# Patient Record
Sex: MALE | Race: WHITE | NOT HISPANIC OR LATINO | Employment: FULL TIME | URBAN - METROPOLITAN AREA
[De-identification: names, ages, dates, MRNs, and addresses within clinical notes are randomized per-mention and may not be internally consistent; named-entity substitution may affect disease eponyms.]

---

## 2019-04-23 ENCOUNTER — OFFICE VISIT (OUTPATIENT)
Dept: PRIMARY CARE CLINIC | Facility: CLINIC | Age: 59
End: 2019-04-23
Attending: NURSE PRACTITIONER
Payer: COMMERCIAL

## 2019-04-23 VITALS
SYSTOLIC BLOOD PRESSURE: 149 MMHG | RESPIRATION RATE: 16 BRPM | OXYGEN SATURATION: 98 % | HEART RATE: 70 BPM | BODY MASS INDEX: 19.25 KG/M2 | HEIGHT: 74 IN | TEMPERATURE: 99 F | WEIGHT: 150 LBS | DIASTOLIC BLOOD PRESSURE: 74 MMHG

## 2019-04-23 DIAGNOSIS — F11.20 OPIATE DEPENDENCE, CONTINUOUS: Primary | ICD-10-CM

## 2019-04-23 DIAGNOSIS — G89.29 CHRONIC NECK AND BACK PAIN: ICD-10-CM

## 2019-04-23 DIAGNOSIS — M54.2 CHRONIC NECK AND BACK PAIN: ICD-10-CM

## 2019-04-23 DIAGNOSIS — G89.4 CHRONIC PAIN SYNDROME: ICD-10-CM

## 2019-04-23 DIAGNOSIS — M54.9 CHRONIC NECK AND BACK PAIN: ICD-10-CM

## 2019-04-23 DIAGNOSIS — F11.93 OPIATE WITHDRAWAL: ICD-10-CM

## 2019-04-23 DIAGNOSIS — F32.A DEPRESSION, UNSPECIFIED DEPRESSION TYPE: ICD-10-CM

## 2019-04-23 PROCEDURE — 99202 OFFICE O/P NEW SF 15 MIN: CPT | Mod: S$GLB,,, | Performed by: NURSE PRACTITIONER

## 2019-04-23 PROCEDURE — 3008F BODY MASS INDEX DOCD: CPT | Mod: CPTII,S$GLB,, | Performed by: NURSE PRACTITIONER

## 2019-04-23 PROCEDURE — 99999 PR PBB SHADOW E&M-NEW PATIENT-LVL III: ICD-10-PCS | Mod: PBBFAC,,, | Performed by: NURSE PRACTITIONER

## 2019-04-23 PROCEDURE — 99202 PR OFFICE/OUTPT VISIT, NEW, LEVL II, 15-29 MIN: ICD-10-PCS | Mod: S$GLB,,, | Performed by: NURSE PRACTITIONER

## 2019-04-23 PROCEDURE — 3008F PR BODY MASS INDEX (BMI) DOCUMENTED: ICD-10-PCS | Mod: CPTII,S$GLB,, | Performed by: NURSE PRACTITIONER

## 2019-04-23 PROCEDURE — 99999 PR PBB SHADOW E&M-NEW PATIENT-LVL III: CPT | Mod: PBBFAC,,, | Performed by: NURSE PRACTITIONER

## 2019-04-23 RX ORDER — OXYCODONE AND ACETAMINOPHEN 10; 325 MG/1; MG/1
TABLET ORAL
Refills: 0 | COMMUNITY
Start: 2019-02-01 | End: 2019-05-22

## 2019-04-23 RX ORDER — FLUOXETINE HYDROCHLORIDE 20 MG/1
20 CAPSULE ORAL EVERY 12 HOURS
Refills: 11 | Status: ON HOLD | COMMUNITY
Start: 2019-01-29 | End: 2021-09-17

## 2019-04-23 RX ORDER — HYDROCODONE BITARTRATE AND ACETAMINOPHEN 10; 325 MG/1; MG/1
1 TABLET ORAL
COMMUNITY
End: 2019-05-22

## 2019-04-23 NOTE — PROGRESS NOTES
"Patient identified by name and date of birth. Patient states he wants to be seen as he is not feeling well. States,"I just don't feel well." In getting patients history and chief complaint he states that he takes oxycodone and hydrocodone and that he promised his kids on Sunday he would stop taking this medication. States he has not had this medication since Sunday and now he does not feel well and is getting a runny nose. Adds that he has cervical disc and lumbar disc pain and that is what he has been taking this medication for, for over twenty years. Patient would not say who prescribes this medication just states that "He" switches it up.  "

## 2019-04-23 NOTE — PROGRESS NOTES
"Subjective:       Patient ID: Beka Bee is a 59 y.o. male.    Chief Complaint: Fatigue    Patient is a 59 year old male who presents to clinic today as an acute walk in patient with complaints of "not feeling good" .  He has never been seen by this provider and is new to this clinic.  He reports he is "very tired and feels terrible".  He states he is withdrawn and "hiding at home not going to the grocery or socializing with anyone".  He is feeling that "he cannot do his job and is very depressed".  He states he has been taking oxycodone and hydrocodone for a long time and stopped taking them on Sunday.  He was taking fluoxetine 20 mg and stopped taking it.  He states he has had chronic neck and back pain and is followed by Dr. De La Cruz in [place], [place] but "does not want to take pain medication any more".  Upon initial discussion patient reports he has "thought about suicide" but then reported "he would never do that and wants to see his children graduate college".  When asked about any self harm/suicidal plan or any homicidal ideation patient denied any.  He was asked more than once and adamantly denies any.  He is vague about his physical symptoms and reports "I just do not feel good".  He denies any chest pain, SOB/HERZOG, palpitations, HA, vision changes or limb weakness.  He reports "no energy and runny nose".     During our discussion patient was instructed I believe it is prudent that he be taken to the ED for immediate evaluation however, patient refuses.  He was instructed he is likely experiencing withdrawl from opiates and exacerbation of depression secondary to stopping his Fluoxetine.  He was instructed it is against medical advice that he drive himself to the ED however, patient again refused any transport.  He insists on leaving and did agree to sign an AMA form.  He does report that [facility].  He was counseled he is at risk for MVA, cardiac or " respiratory arrest, seizure, death and others.  He verbalized understanding and left clinic in no apparent acute distress ambulating without difficulty.          Review of Systems    Objective:      Physical Exam    Medication List with Changes/Refills   Current Medications    FLUOXETINE 20 MG CAPSULE    Take 20 mg by mouth every 12 (twelve) hours.    OXYCODONE-ACETAMINOPHEN (PERCOCET)  MG PER TABLET    TAKE 1 TABLET BY MOUTH EVERY 6 HOURS AS NEEDED FOR PAIN FOR 30 DAYS     No results found for this or any previous visit.  Assessment:       No diagnosis found.    Plan:       There are no diagnoses linked to this encounter.      No follow-ups on file.    If symptoms worsen patient may call for ASAP appointment or report to the emergency department for further evaluation.       I have reviewed the patient's past medical/surgical and social histories and updated as appropriate. Medications were reviewed and discussed as appropriate including side effects and risks versus benefit. Plan of care was reviewed and agreed upon with the patient.  An opportunity to ask questions was provided and explanation given. Patient verbalized understanding on all information reviewed and discussed.

## 2019-04-25 ENCOUNTER — OFFICE VISIT (OUTPATIENT)
Dept: FAMILY MEDICINE | Facility: CLINIC | Age: 59
End: 2019-04-25
Payer: COMMERCIAL

## 2019-04-25 VITALS
TEMPERATURE: 98 F | BODY MASS INDEX: 19.15 KG/M2 | SYSTOLIC BLOOD PRESSURE: 138 MMHG | HEIGHT: 74 IN | DIASTOLIC BLOOD PRESSURE: 80 MMHG | OXYGEN SATURATION: 97 % | HEART RATE: 81 BPM | WEIGHT: 149.25 LBS

## 2019-04-25 DIAGNOSIS — F11.20 NARCOTIC DEPENDENCE: Primary | ICD-10-CM

## 2019-04-25 PROCEDURE — 3008F BODY MASS INDEX DOCD: CPT | Mod: CPTII,S$GLB,, | Performed by: INTERNAL MEDICINE

## 2019-04-25 PROCEDURE — 3008F PR BODY MASS INDEX (BMI) DOCUMENTED: ICD-10-PCS | Mod: CPTII,S$GLB,, | Performed by: INTERNAL MEDICINE

## 2019-04-25 PROCEDURE — 99214 PR OFFICE/OUTPT VISIT, EST, LEVL IV, 30-39 MIN: ICD-10-PCS | Mod: S$GLB,,, | Performed by: INTERNAL MEDICINE

## 2019-04-25 PROCEDURE — 80305 POCT BUP URINE DRUG TEST: ICD-10-PCS | Mod: QW,S$GLB,, | Performed by: INTERNAL MEDICINE

## 2019-04-25 PROCEDURE — 99214 OFFICE O/P EST MOD 30 MIN: CPT | Mod: S$GLB,,, | Performed by: INTERNAL MEDICINE

## 2019-04-25 PROCEDURE — 80305 DRUG TEST PRSMV DIR OPT OBS: CPT | Mod: QW,S$GLB,, | Performed by: INTERNAL MEDICINE

## 2019-04-25 RX ORDER — BUPRENORPHINE AND NALOXONE 8; 2 MG/1; MG/1
1 FILM, SOLUBLE BUCCAL; SUBLINGUAL 2 TIMES DAILY
Qty: 2 PACKET | Refills: 0 | Status: SHIPPED | OUTPATIENT
Start: 2019-04-25 | End: 2019-04-26

## 2019-04-26 ENCOUNTER — OFFICE VISIT (OUTPATIENT)
Dept: FAMILY MEDICINE | Facility: CLINIC | Age: 59
End: 2019-04-26
Payer: COMMERCIAL

## 2019-04-26 VITALS
BODY MASS INDEX: 19.15 KG/M2 | DIASTOLIC BLOOD PRESSURE: 62 MMHG | WEIGHT: 149.25 LBS | SYSTOLIC BLOOD PRESSURE: 130 MMHG | OXYGEN SATURATION: 97 % | TEMPERATURE: 98 F | HEART RATE: 82 BPM | HEIGHT: 74 IN

## 2019-04-26 DIAGNOSIS — F11.20 NARCOTIC DEPENDENCE: Primary | ICD-10-CM

## 2019-04-26 PROCEDURE — 3008F PR BODY MASS INDEX (BMI) DOCUMENTED: ICD-10-PCS | Mod: CPTII,S$GLB,, | Performed by: INTERNAL MEDICINE

## 2019-04-26 PROCEDURE — 3008F BODY MASS INDEX DOCD: CPT | Mod: CPTII,S$GLB,, | Performed by: INTERNAL MEDICINE

## 2019-04-26 PROCEDURE — 99213 PR OFFICE/OUTPT VISIT, EST, LEVL III, 20-29 MIN: ICD-10-PCS | Mod: S$GLB,,, | Performed by: INTERNAL MEDICINE

## 2019-04-26 PROCEDURE — 99213 OFFICE O/P EST LOW 20 MIN: CPT | Mod: S$GLB,,, | Performed by: INTERNAL MEDICINE

## 2019-04-26 RX ORDER — BUPRENORPHINE AND NALOXONE 8; 2 MG/1; MG/1
FILM, SOLUBLE BUCCAL; SUBLINGUAL
Qty: 3 PACKET | Refills: 0 | Status: SHIPPED | OUTPATIENT
Start: 2019-04-26 | End: 2019-05-22 | Stop reason: DRUGHIGH

## 2019-04-26 NOTE — PATIENT INSTRUCTIONS
An order for a urine drug screen with buprenorphine was given.  The patient is to use the order when called, on a random day.       Come back in 3 days for the rest of your script

## 2019-04-26 NOTE — PROGRESS NOTES
Subjective:       Patient ID: Beka Bee is a 59 y.o. male.    Chief Complaint: Withdrawal    HPI     For titration    Clinical Opiate Withdrawal Scale (COWS)    PULSE RATE:                                                                 1  0=  <81   1 =          2 = 101-120   4 = > 120            Restlessness                                                                    0    0 = able to sit still               1 = reports difficulty sitting still, but is able to do so   3 = frequent shifting or extraneous movements of legs/arms  5 = Unable to sit still for more than a few seconds    Pupil size                                                                         0  0=normal or pinpoint  1 = possibly dilated  2=dilated  5= completely dilated.     New achiness                                                                  1  0=none  1=mild achiness  2= severe reported achiness  4=pt can't sit still due to achiness.     rhinorhia/tearing (not from cold)                                          2  0=none 1=nasal congestion/moist eyes   2=runny nose/tearing 4=nose/eyes pouring    GI Upset: Over Last 1/2 Hour                                             2                      0=none. 1=cramps 2=nausea/lose stool  3=vomiting/diarhia 5= multiple vomiting/diarhia      Tremor:                                                                            0            0=none 1=felt, not seen 2= mild 4=gross tremor/twitch    Yawning                                                                          2.    none=0  1=1-2x  2= >3 or more     4= >3/min    anxiety                                                                          0    0=none 1=reports anxiety 2=obvious anxiety  4=so anxious or irritable, that its hard to do interview    Gooseflesh                                                                    0  0=smooth skin 3=palbable piloerection  5=prominent piloerection                            score                              total                                       8      5-12 = Mild                                                        13-24 = Moderate  25-36 = Moderately Severe  More than 36 = Severe Withdrawal      After suboxone 4 mg  Clinical Opiate Withdrawal Scale (COWS)    PULSE RATE:                                                                 0  0=  <81   1 =          2 = 101-120   4 = > 120            Restlessness                                                                    0    0 = able to sit still               1 = reports difficulty sitting still, but is able to do so   3 = frequent shifting or extraneous movements of legs/arms  5 = Unable to sit still for more than a few seconds    Pupil size                                                                         0  0=normal or pinpoint  1 = possibly dilated  2=dilated  5= completely dilated.     New achiness                                                                  0  0=none  1=mild achiness  2= severe reported achiness  4=pt can't sit still due to achiness.     rhinorhia/tearing (not from cold)                                          1  0=none 1=nasal congestion/moist eyes   2=runny nose/tearing 4=nose/eyes pouring    GI Upset: Over Last 1/2 Hour                                             0                      0=none. 1=cramps 2=nausea/lose stool  3=vomiting/diarhia 5= multiple vomiting/diarhia      Tremor:                                                                            0            0=none 1=felt, not seen 2= mild 4=gross tremor/twitch    Yawning                                                                          0.    none=0  1=1-2x  2= >3 or more     4= >3/min    anxiety                                                                          0    0=none 1=reports anxiety 2=obvious anxiety  4=so anxious or irritable, that its hard to do interview    Gooseflesh                           "                                          0  0=smooth skin 3=palbable piloerection  5=prominent piloerection                           score                              total                                       1      5-12 = Mild                                                        13-24 = Moderate  25-36 = Moderately Severe  More than 36 = Severe Withdrawal    Review of Systems      Objective:      Vitals:    04/26/19 0803   BP: 130/62   Pulse: 82   Temp: 97.9 °F (36.6 °C)   TempSrc: Oral   SpO2: 97%   Weight: 67.7 kg (149 lb 4 oz)   Height: 6' 2" (1.88 m)   PainSc:   8     Physical Exam   Constitutional: He appears well-developed and well-nourished.   Cardiovascular: Normal rate, regular rhythm and normal heart sounds.   Pulmonary/Chest: Effort normal and breath sounds normal.   Abdominal: Soft. There is no tenderness.   Neurological: He is alert.   Psychiatric: He has a normal mood and affect. His behavior is normal. Thought content normal.   Nursing note and vitals reviewed.        Assessment:       1. Narcotic dependence          Plan:       Narcotic dependence  -     buprenorphine-naloxone (SUBOXONE) 8-2 mg Film; Half sl tid  Dispense: 3 packet; Refill: 0      Follow up in about 1 month (around 5/26/2019).      "

## 2019-04-29 ENCOUNTER — TELEPHONE (OUTPATIENT)
Dept: FAMILY MEDICINE | Facility: CLINIC | Age: 59
End: 2019-04-29

## 2019-04-29 DIAGNOSIS — F11.20 NARCOTIC DEPENDENCE: Primary | ICD-10-CM

## 2019-04-29 RX ORDER — BUPRENORPHINE AND NALOXONE 8; 2 MG/1; MG/1
1 FILM, SOLUBLE BUCCAL; SUBLINGUAL 2 TIMES DAILY
Qty: 60 PACKET | Refills: 0 | Status: SHIPPED | OUTPATIENT
Start: 2019-04-29 | End: 2019-05-22 | Stop reason: SDUPTHER

## 2019-05-01 LAB
AMP D-AMPHETAMINE 1000 NG/ML: NEGATIVE
BAR SECOBARBITAL 300 NG/ML: NEGATIVE
BUP BUPRENORPHINE 10 NG/ML: POSITIVE
BZO OXAZEPAM 300 NG/ML: NEGATIVE
COC BENZOYLECGONINE 300 NG/ML: NEGATIVE
CTP QC/QA: YES
MET D-METHAMPHETAMINE 500 NG/ML: NEGATIVE
MOP MORPHINE 300 NG/ML: NEGATIVE
MTD METHADONE 300 NG/ML: NEGATIVE
QXY OXYCODONE 100 NG/ML: POSITIVE
THC 11-NOR-9-TETRAHYDROCANNABINOL-9-CARBOXYLIC ACID: NEGATIVE

## 2019-05-22 ENCOUNTER — OFFICE VISIT (OUTPATIENT)
Dept: FAMILY MEDICINE | Facility: CLINIC | Age: 59
End: 2019-05-22
Payer: COMMERCIAL

## 2019-05-22 ENCOUNTER — DOCUMENTATION ONLY (OUTPATIENT)
Dept: FAMILY MEDICINE | Facility: CLINIC | Age: 59
End: 2019-05-22

## 2019-05-22 VITALS
OXYGEN SATURATION: 96 % | SYSTOLIC BLOOD PRESSURE: 142 MMHG | HEIGHT: 74 IN | HEART RATE: 72 BPM | BODY MASS INDEX: 19.52 KG/M2 | DIASTOLIC BLOOD PRESSURE: 86 MMHG | WEIGHT: 152.13 LBS | RESPIRATION RATE: 16 BRPM | TEMPERATURE: 98 F

## 2019-05-22 DIAGNOSIS — K59.03 DRUG-INDUCED CONSTIPATION: Primary | ICD-10-CM

## 2019-05-22 DIAGNOSIS — F11.20 NARCOTIC DEPENDENCE: ICD-10-CM

## 2019-05-22 PROCEDURE — 3008F PR BODY MASS INDEX (BMI) DOCUMENTED: ICD-10-PCS | Mod: CPTII,S$GLB,, | Performed by: INTERNAL MEDICINE

## 2019-05-22 PROCEDURE — 99213 OFFICE O/P EST LOW 20 MIN: CPT | Mod: S$GLB,,, | Performed by: INTERNAL MEDICINE

## 2019-05-22 PROCEDURE — 3008F BODY MASS INDEX DOCD: CPT | Mod: CPTII,S$GLB,, | Performed by: INTERNAL MEDICINE

## 2019-05-22 PROCEDURE — 99213 PR OFFICE/OUTPT VISIT, EST, LEVL III, 20-29 MIN: ICD-10-PCS | Mod: S$GLB,,, | Performed by: INTERNAL MEDICINE

## 2019-05-22 RX ORDER — AMOXICILLIN 250 MG
1 CAPSULE ORAL 2 TIMES DAILY
Qty: 60 TABLET | Refills: 5 | COMMUNITY
Start: 2019-05-22 | End: 2023-09-13

## 2019-05-22 RX ORDER — BUPRENORPHINE AND NALOXONE 8; 2 MG/1; MG/1
1 FILM, SOLUBLE BUCCAL; SUBLINGUAL 2 TIMES DAILY
Qty: 60 PACKET | Refills: 0 | Status: SHIPPED | OUTPATIENT
Start: 2019-05-22 | End: 2019-06-21 | Stop reason: SDUPTHER

## 2019-05-22 NOTE — PROGRESS NOTES
"Subjective:       Patient ID: Beka Bee is a 59 y.o. male.    Chief Complaint: opioid dependence    HPI   The patient presents for medical management of opioid dependency. he is receiving maintenance therapy with buprenorphine.      CHIEF COMPLAINT: opoid dependence  HPI:     ONSET/TIMING:     DURATION: . Continuous(+).    QUALITY/COURSE:  unchanged.     INTENSITY/SEVERITY:  controlled.    CONTEXT/WHEN:     MODIFIERS/TREATMENTS:  Taking medications(+) Suboxone  8/2 # 60,   last rx given 4/29/19, here early due to physician vacation. . .    SYMPTOMS/RELATED: no withdrawal symptoms. no cravings . No substance abuse.  No alcohol use     pnp checked: last month:  yes    Review of Systems   Constitutional: Negative for diaphoresis, fatigue and unexpected weight change.   Gastrointestinal: Positive for constipation. Negative for diarrhea, nausea and vomiting.   Neurological: Negative for dizziness, light-headedness and headaches.   Psychiatric/Behavioral: Positive for sleep disturbance. Negative for dysphoric mood. The patient is not nervous/anxious.          Objective:      Vitals:    05/22/19 0911   BP: (!) 142/86   Pulse: 72   Resp: 16   Temp: 98.2 °F (36.8 °C)   TempSrc: Oral   SpO2: 96%   Weight: 69 kg (152 lb 1.9 oz)   Height: 6' 2" (1.88 m)   PainSc:   8   PainLoc: Neck     Physical Exam   Constitutional: He appears well-developed and well-nourished.   Cardiovascular: Normal rate, regular rhythm and normal heart sounds.   Pulmonary/Chest: Effort normal and breath sounds normal.   Abdominal: Soft. There is no tenderness.   Neurological: He is alert.   Psychiatric: He has a normal mood and affect. His behavior is normal. Thought content normal.   Nursing note and vitals reviewed.   Non random drug screen showed oxycodone and Suboxone, was not called for a random.       Assessment:       1. Drug-induced constipation    2. Narcotic dependence          Plan:   .  (+) pt taking medication as prescribed.    (+) dose " is approproate.    (+) urine drug screen done.   (+) discussed risks of buprenorphine, including to others.     (+) assessed if benefits outweigh risks of buprenorphine. .     (+) reviewed safe storage of medication.     (+) discussed proper use of buprenorphine, including missed doses.    Drug-induced constipation  -     senna-docusate 8.6-50 mg (SENNA WITH DOCUSATE SODIUM) 8.6-50 mg per tablet; Take 1 tablet by mouth 2 (two) times daily.  Dispense: 60 tablet; Refill: 5    Narcotic dependence  -     buprenorphine-naloxone (SUBOXONE) 8-2 mg Film; Place 1 packet (1 each total) under the tongue 2 (two) times daily.  Dispense: 60 packet; Refill: 0      Follow up in about 1 month (around 6/22/2019).

## 2019-05-22 NOTE — PATIENT INSTRUCTIONS
An order for a urine drug screen with buprenorphine was given.  The patient is to use the order when called, on a random day.     Take the Suboxone 12 hr apart.

## 2019-05-22 NOTE — PROGRESS NOTES
Health Maintenance Due   Topic Date Due    Hepatitis C Screening  1960    Lipid Panel  1960    TETANUS VACCINE  04/18/1977    Pneumococcal Vaccine (Medium Risk) (1 of 1 - PPSV23) 04/18/1978    Colonoscopy  04/18/2009

## 2019-05-30 ENCOUNTER — TELEPHONE (OUTPATIENT)
Dept: FAMILY MEDICINE | Facility: CLINIC | Age: 59
End: 2019-05-30

## 2019-05-30 NOTE — TELEPHONE ENCOUNTER
----- Message from Julian Medel sent at 5/30/2019  2:27 PM CDT -----  Type:  Patient Returning Call    Who Called:  Patient  Who Left Message for Patient:  Kayley  Does the patient know what this is regarding?:  No  Best Call Back Number:  [phone] (home)

## 2019-05-31 ENCOUNTER — CLINICAL SUPPORT (OUTPATIENT)
Dept: FAMILY MEDICINE | Facility: CLINIC | Age: 59
End: 2019-05-31
Payer: COMMERCIAL

## 2019-05-31 DIAGNOSIS — F11.20 NARCOTIC DEPENDENCE: Primary | ICD-10-CM

## 2019-05-31 LAB
AMP D-AMPHETAMINE 1000 NG/ML: NEGATIVE
BAR SECOBARBITAL 300 NG/ML: NEGATIVE
BUP BUPRENORPHINE 10 NG/ML: POSITIVE
BZO OXAZEPAM 300 NG/ML: NEGATIVE
COC BENZOYLECGONINE 300 NG/ML: NEGATIVE
CTP QC/QA: YES
MET D-METHAMPHETAMINE 500 NG/ML: NEGATIVE
MOP MORPHINE 300 NG/ML: NEGATIVE
MTD METHADONE 300 NG/ML: NEGATIVE
QXY OXYCODONE 100 NG/ML: NEGATIVE
THC 11-NOR-9-TETRAHYDROCANNABINOL-9-CARBOXYLIC ACID: NEGATIVE

## 2019-05-31 PROCEDURE — 80305 DRUG TEST PRSMV DIR OPT OBS: CPT | Mod: QW,S$GLB,, | Performed by: INTERNAL MEDICINE

## 2019-05-31 PROCEDURE — 80305 POCT BUP URINE DRUG TEST: ICD-10-PCS | Mod: QW,S$GLB,, | Performed by: INTERNAL MEDICINE

## 2019-06-10 ENCOUNTER — TELEPHONE (OUTPATIENT)
Dept: FAMILY MEDICINE | Facility: CLINIC | Age: 59
End: 2019-06-10

## 2019-06-10 NOTE — TELEPHONE ENCOUNTER
----- Message from Jolynn Shane sent at 6/10/2019  9:28 AM CDT -----  Contact: Avril from [facility]  Type:  Pharmacy Calling to Clarify an RX    Name of Caller:  Avril  Pharmacy Name:    [facility] - [place], [place] - [address]  [address]  [place] [address]  Phone: [phone] Fax: [phone]  Prescription Name:  Pain medication  What do they need to clarify?:   Rx is from a dentist and checking if approved to fill Rx  Best Call Back Number:  [phone]  Additional Information:  na

## 2019-06-13 ENCOUNTER — CLINICAL SUPPORT (OUTPATIENT)
Dept: FAMILY MEDICINE | Facility: CLINIC | Age: 59
End: 2019-06-13
Payer: COMMERCIAL

## 2019-06-13 ENCOUNTER — TELEPHONE (OUTPATIENT)
Dept: FAMILY MEDICINE | Facility: CLINIC | Age: 59
End: 2019-06-13

## 2019-06-13 DIAGNOSIS — F11.20 NARCOTIC DEPENDENCE: Primary | ICD-10-CM

## 2019-06-13 PROCEDURE — 80305 POCT BUP URINE DRUG TEST: ICD-10-PCS | Mod: QW,S$GLB,, | Performed by: INTERNAL MEDICINE

## 2019-06-13 PROCEDURE — 80305 DRUG TEST PRSMV DIR OPT OBS: CPT | Mod: QW,S$GLB,, | Performed by: INTERNAL MEDICINE

## 2019-06-21 ENCOUNTER — CLINICAL SUPPORT (OUTPATIENT)
Dept: FAMILY MEDICINE | Facility: CLINIC | Age: 59
End: 2019-06-21
Payer: COMMERCIAL

## 2019-06-21 ENCOUNTER — OFFICE VISIT (OUTPATIENT)
Dept: FAMILY MEDICINE | Facility: CLINIC | Age: 59
End: 2019-06-21
Payer: COMMERCIAL

## 2019-06-21 VITALS
HEIGHT: 74 IN | RESPIRATION RATE: 16 BRPM | HEART RATE: 76 BPM | BODY MASS INDEX: 18.34 KG/M2 | OXYGEN SATURATION: 97 % | DIASTOLIC BLOOD PRESSURE: 82 MMHG | SYSTOLIC BLOOD PRESSURE: 126 MMHG | TEMPERATURE: 98 F | WEIGHT: 142.88 LBS

## 2019-06-21 DIAGNOSIS — F11.20 NARCOTIC DEPENDENCE: Primary | ICD-10-CM

## 2019-06-21 DIAGNOSIS — F11.20 NARCOTIC DEPENDENCE: ICD-10-CM

## 2019-06-21 PROCEDURE — 80305 DRUG TEST PRSMV DIR OPT OBS: CPT | Mod: QW,S$GLB,, | Performed by: INTERNAL MEDICINE

## 2019-06-21 PROCEDURE — 3008F PR BODY MASS INDEX (BMI) DOCUMENTED: ICD-10-PCS | Mod: CPTII,S$GLB,, | Performed by: INTERNAL MEDICINE

## 2019-06-21 PROCEDURE — 80305 POCT BUP URINE DRUG TEST: ICD-10-PCS | Mod: QW,S$GLB,, | Performed by: INTERNAL MEDICINE

## 2019-06-21 PROCEDURE — 3008F BODY MASS INDEX DOCD: CPT | Mod: CPTII,S$GLB,, | Performed by: INTERNAL MEDICINE

## 2019-06-21 PROCEDURE — 99213 OFFICE O/P EST LOW 20 MIN: CPT | Mod: S$GLB,,, | Performed by: INTERNAL MEDICINE

## 2019-06-21 PROCEDURE — 99213 PR OFFICE/OUTPT VISIT, EST, LEVL III, 20-29 MIN: ICD-10-PCS | Mod: S$GLB,,, | Performed by: INTERNAL MEDICINE

## 2019-06-21 RX ORDER — BUPRENORPHINE AND NALOXONE 8; 2 MG/1; MG/1
1 FILM, SOLUBLE BUCCAL; SUBLINGUAL 2 TIMES DAILY
Qty: 60 PACKET | Refills: 0 | Status: SHIPPED | OUTPATIENT
Start: 2019-06-21 | End: 2019-07-21

## 2019-06-21 NOTE — PATIENT INSTRUCTIONS
Urine drug screen negative except buprenorphine.     Drink instant breakfast or other food supplements to get your calories up

## 2019-06-21 NOTE — PROGRESS NOTES
"Subjective:       Patient ID: Beka Bee is a 59 y.o. male.    Chief Complaint: narcotic dependence    HPI     The patient presents for medical management of opioid dependency. he is receiving maintenance therapy with buprenorphine.      CHIEF COMPLAINT: opoid dependence  HPI:  Her that she having problems the patient just cold    ONSET/TIMING:     DURATION: . Continuous(+).    QUALITY/COURSE:  unchanged.     INTENSITY/SEVERITY:  controlled.    CONTEXT/WHEN:     MODIFIERS/TREATMENTS:  Taking medications(+) Suboxone  8/2 # 60,   last rx given 4/29/19, here early due to physician vacation. . .    SYMPTOMS/RELATED: no withdrawal symptoms. no cravings . No substance abuse.  No alcohol use     pnp checked: last month:  Yes    Since having teeth pulled he has not been eating.    Review of Systems   Constitutional: Negative for diaphoresis, fatigue and unexpected weight change.   Gastrointestinal: Positive for constipation. Negative for diarrhea, nausea and vomiting.   Neurological: Negative for dizziness, light-headedness and headaches.   Psychiatric/Behavioral: Positive for sleep disturbance. Negative for dysphoric mood. The patient is not nervous/anxious.          Objective:      Vitals:    06/21/19 0943   BP: 126/82   Pulse: 76   Resp: 16   Temp: 98.1 °F (36.7 °C)   TempSrc: Oral   SpO2: 97%   Weight: 64.8 kg (142 lb 13.7 oz)   Height: 6' 2" (1.88 m)   PainSc:   8   PainLoc: Back    10 lb weight loss  Physical Exam   Constitutional: He appears well-developed and well-nourished.   Cardiovascular: Normal rate, regular rhythm and normal heart sounds.   Pulmonary/Chest: Effort normal and breath sounds normal.   Abdominal: Soft. There is no tenderness.   Neurological: He is alert.   Psychiatric: He has a normal mood and affect. His behavior is normal. Thought content normal.   Nursing note and vitals reviewed.      Urine drug screen negative except buprenorphine.     Assessment:       1. Narcotic dependence        "   Plan:   .  (+) pt taking medication as prescribed.    (+) dose is approproate.    (+) urine drug screen done.   (+) discussed risks of buprenorphine, including to others.     (+) assessed if benefits outweigh risks of buprenorphine. .     (+) reviewed safe storage of medication.     (+) discussed proper use of buprenorphine, including missed doses.    Narcotic dependence  -     buprenorphine-naloxone (SUBOXONE) 8-2 mg Film; Place 1 packet (1 each total) under the tongue 2 (two) times daily.  Dispense: 60 packet; Refill: 0      Follow up in about 1 month (around 7/21/2019).

## 2019-07-18 ENCOUNTER — DOCUMENTATION ONLY (OUTPATIENT)
Dept: FAMILY MEDICINE | Facility: CLINIC | Age: 59
End: 2019-07-18

## 2019-07-25 ENCOUNTER — TELEPHONE (OUTPATIENT)
Dept: FAMILY MEDICINE | Facility: CLINIC | Age: 59
End: 2019-07-25

## 2019-08-13 ENCOUNTER — TELEPHONE (OUTPATIENT)
Dept: FAMILY MEDICINE | Facility: CLINIC | Age: 59
End: 2019-08-13

## 2019-11-20 ENCOUNTER — TELEPHONE (OUTPATIENT)
Dept: FAMILY MEDICINE | Facility: CLINIC | Age: 59
End: 2019-11-20

## 2020-01-09 ENCOUNTER — TELEPHONE (OUTPATIENT)
Dept: FAMILY MEDICINE | Facility: CLINIC | Age: 60
End: 2020-01-09

## 2020-02-04 ENCOUNTER — TELEPHONE (OUTPATIENT)
Dept: FAMILY MEDICINE | Facility: CLINIC | Age: 60
End: 2020-02-04

## 2020-03-12 ENCOUNTER — TELEPHONE (OUTPATIENT)
Dept: FAMILY MEDICINE | Facility: CLINIC | Age: 60
End: 2020-03-12

## 2021-05-17 ENCOUNTER — OFFICE VISIT (OUTPATIENT)
Dept: URGENT CARE | Facility: CLINIC | Age: 61
End: 2021-05-17
Payer: COMMERCIAL

## 2021-05-17 VITALS
TEMPERATURE: 97 F | HEART RATE: 87 BPM | BODY MASS INDEX: 17.45 KG/M2 | OXYGEN SATURATION: 97 % | DIASTOLIC BLOOD PRESSURE: 81 MMHG | WEIGHT: 136 LBS | HEIGHT: 74 IN | SYSTOLIC BLOOD PRESSURE: 153 MMHG | RESPIRATION RATE: 18 BRPM

## 2021-05-17 DIAGNOSIS — R11.0 NAUSEA: Primary | ICD-10-CM

## 2021-05-17 DIAGNOSIS — R42 DIZZINESS: ICD-10-CM

## 2021-05-17 LAB — H PYLORI INDEX VALUE: NEGATIVE

## 2021-05-17 PROCEDURE — 99204 OFFICE O/P NEW MOD 45 MIN: CPT | Mod: 25,S$GLB,, | Performed by: NURSE PRACTITIONER

## 2021-05-17 PROCEDURE — 93000 PR ELECTROCARDIOGRAM, COMPLETE: ICD-10-PCS | Mod: S$GLB,,, | Performed by: NURSE PRACTITIONER

## 2021-05-17 PROCEDURE — 99204 PR OFFICE/OUTPT VISIT, NEW, LEVL IV, 45-59 MIN: ICD-10-PCS | Mod: 25,S$GLB,, | Performed by: NURSE PRACTITIONER

## 2021-05-17 PROCEDURE — 3008F BODY MASS INDEX DOCD: CPT | Mod: CPTII,S$GLB,, | Performed by: NURSE PRACTITIONER

## 2021-05-17 PROCEDURE — 93000 ELECTROCARDIOGRAM COMPLETE: CPT | Mod: S$GLB,,, | Performed by: NURSE PRACTITIONER

## 2021-05-17 PROCEDURE — 3008F PR BODY MASS INDEX (BMI) DOCUMENTED: ICD-10-PCS | Mod: CPTII,S$GLB,, | Performed by: NURSE PRACTITIONER

## 2021-05-17 RX ORDER — OXYCODONE HYDROCHLORIDE 15 MG/1
15 TABLET, FILM COATED, EXTENDED RELEASE ORAL EVERY 12 HOURS
COMMUNITY
End: 2021-12-14

## 2021-05-17 RX ORDER — OMEPRAZOLE 20 MG/1
20 CAPSULE, DELAYED RELEASE ORAL DAILY
Qty: 30 CAPSULE | Refills: 11 | Status: SHIPPED | OUTPATIENT
Start: 2021-05-17 | End: 2021-06-01 | Stop reason: ALTCHOICE

## 2021-05-17 RX ORDER — ONDANSETRON 4 MG/1
4 TABLET, ORALLY DISINTEGRATING ORAL EVERY 6 HOURS PRN
Qty: 15 TABLET | Refills: 0 | Status: SHIPPED | OUTPATIENT
Start: 2021-05-17 | End: 2021-10-11

## 2021-06-01 ENCOUNTER — TELEPHONE (OUTPATIENT)
Dept: FAMILY MEDICINE | Facility: CLINIC | Age: 61
End: 2021-06-01

## 2021-06-01 ENCOUNTER — OFFICE VISIT (OUTPATIENT)
Dept: URGENT CARE | Facility: CLINIC | Age: 61
End: 2021-06-01
Payer: COMMERCIAL

## 2021-06-01 VITALS
WEIGHT: 137 LBS | OXYGEN SATURATION: 97 % | TEMPERATURE: 98 F | DIASTOLIC BLOOD PRESSURE: 85 MMHG | SYSTOLIC BLOOD PRESSURE: 158 MMHG | BODY MASS INDEX: 17.59 KG/M2 | HEART RATE: 94 BPM | RESPIRATION RATE: 16 BRPM

## 2021-06-01 DIAGNOSIS — R11.0 NAUSEA: Primary | ICD-10-CM

## 2021-06-01 PROCEDURE — 99214 PR OFFICE/OUTPT VISIT, EST, LEVL IV, 30-39 MIN: ICD-10-PCS | Mod: S$GLB,,, | Performed by: EMERGENCY MEDICINE

## 2021-06-01 PROCEDURE — 3008F BODY MASS INDEX DOCD: CPT | Mod: CPTII,S$GLB,, | Performed by: EMERGENCY MEDICINE

## 2021-06-01 PROCEDURE — 3008F PR BODY MASS INDEX (BMI) DOCUMENTED: ICD-10-PCS | Mod: CPTII,S$GLB,, | Performed by: EMERGENCY MEDICINE

## 2021-06-01 PROCEDURE — 99214 OFFICE O/P EST MOD 30 MIN: CPT | Mod: S$GLB,,, | Performed by: EMERGENCY MEDICINE

## 2021-06-01 RX ORDER — ONDANSETRON 4 MG/1
4 TABLET, FILM COATED ORAL EVERY 6 HOURS PRN
Qty: 20 TABLET | Refills: 1 | Status: SHIPPED | OUTPATIENT
Start: 2021-06-01 | End: 2021-06-14 | Stop reason: SDUPTHER

## 2021-06-01 RX ORDER — PANTOPRAZOLE SODIUM 20 MG/1
40 TABLET, DELAYED RELEASE ORAL DAILY
Qty: 30 TABLET | Refills: 0 | Status: SHIPPED | OUTPATIENT
Start: 2021-06-01 | End: 2021-07-01

## 2021-06-02 ENCOUNTER — TELEPHONE (OUTPATIENT)
Dept: FAMILY MEDICINE | Facility: CLINIC | Age: 61
End: 2021-06-02

## 2021-06-14 ENCOUNTER — LAB VISIT (OUTPATIENT)
Dept: LAB | Facility: HOSPITAL | Age: 61
End: 2021-06-14
Attending: STUDENT IN AN ORGANIZED HEALTH CARE EDUCATION/TRAINING PROGRAM
Payer: COMMERCIAL

## 2021-06-14 ENCOUNTER — OFFICE VISIT (OUTPATIENT)
Dept: FAMILY MEDICINE | Facility: CLINIC | Age: 61
End: 2021-06-14
Payer: COMMERCIAL

## 2021-06-14 ENCOUNTER — TELEPHONE (OUTPATIENT)
Dept: FAMILY MEDICINE | Facility: CLINIC | Age: 61
End: 2021-06-14

## 2021-06-14 ENCOUNTER — TELEPHONE (OUTPATIENT)
Dept: INTERNAL MEDICINE | Facility: CLINIC | Age: 61
End: 2021-06-14

## 2021-06-14 VITALS
TEMPERATURE: 98 F | HEIGHT: 74 IN | BODY MASS INDEX: 17.77 KG/M2 | DIASTOLIC BLOOD PRESSURE: 74 MMHG | HEART RATE: 78 BPM | OXYGEN SATURATION: 97 % | RESPIRATION RATE: 16 BRPM | SYSTOLIC BLOOD PRESSURE: 124 MMHG | WEIGHT: 138.44 LBS

## 2021-06-14 DIAGNOSIS — Z11.59 ENCOUNTER FOR HCV SCREENING TEST FOR LOW RISK PATIENT: ICD-10-CM

## 2021-06-14 DIAGNOSIS — K59.00 CONSTIPATION, UNSPECIFIED CONSTIPATION TYPE: ICD-10-CM

## 2021-06-14 DIAGNOSIS — Z12.5 ENCOUNTER FOR PROSTATE CANCER SCREENING: ICD-10-CM

## 2021-06-14 DIAGNOSIS — F11.20 OPIATE DEPENDENCE, CONTINUOUS: ICD-10-CM

## 2021-06-14 DIAGNOSIS — G89.4 CHRONIC PAIN SYNDROME: ICD-10-CM

## 2021-06-14 DIAGNOSIS — Z13.220 SCREENING FOR LIPID DISORDERS: ICD-10-CM

## 2021-06-14 DIAGNOSIS — M54.2 CHRONIC NECK PAIN: ICD-10-CM

## 2021-06-14 DIAGNOSIS — R11.0 NAUSEA: ICD-10-CM

## 2021-06-14 DIAGNOSIS — F17.210 NICOTINE DEPENDENCE, CIGARETTES, UNCOMPLICATED: ICD-10-CM

## 2021-06-14 DIAGNOSIS — K21.9 GASTROESOPHAGEAL REFLUX DISEASE, UNSPECIFIED WHETHER ESOPHAGITIS PRESENT: ICD-10-CM

## 2021-06-14 DIAGNOSIS — F17.210 CIGARETTE NICOTINE DEPENDENCE WITHOUT COMPLICATION: ICD-10-CM

## 2021-06-14 DIAGNOSIS — D53.9 MACROCYTIC ANEMIA: Primary | ICD-10-CM

## 2021-06-14 DIAGNOSIS — Z00.00 ENCOUNTER FOR PREVENTIVE HEALTH EXAMINATION: ICD-10-CM

## 2021-06-14 DIAGNOSIS — G89.29 CHRONIC NECK PAIN: ICD-10-CM

## 2021-06-14 DIAGNOSIS — Z12.11 COLON CANCER SCREENING: ICD-10-CM

## 2021-06-14 DIAGNOSIS — Z00.00 ENCOUNTER FOR PREVENTIVE HEALTH EXAMINATION: Primary | ICD-10-CM

## 2021-06-14 DIAGNOSIS — F32.A DEPRESSION, UNSPECIFIED DEPRESSION TYPE: ICD-10-CM

## 2021-06-14 LAB
ALBUMIN SERPL BCP-MCNC: 3.8 G/DL (ref 3.5–5.2)
ALP SERPL-CCNC: 99 U/L (ref 55–135)
ALT SERPL W/O P-5'-P-CCNC: 12 U/L (ref 10–44)
ANION GAP SERPL CALC-SCNC: 9 MMOL/L (ref 8–16)
AST SERPL-CCNC: 18 U/L (ref 10–40)
BASOPHILS # BLD AUTO: 0.09 K/UL (ref 0–0.2)
BASOPHILS NFR BLD: 1.1 % (ref 0–1.9)
BILIRUB SERPL-MCNC: 0.2 MG/DL (ref 0.1–1)
BUN SERPL-MCNC: 15 MG/DL (ref 8–23)
CALCIUM SERPL-MCNC: 9.6 MG/DL (ref 8.7–10.5)
CHLORIDE SERPL-SCNC: 104 MMOL/L (ref 95–110)
CHOLEST SERPL-MCNC: 149 MG/DL (ref 120–199)
CHOLEST/HDLC SERPL: 2.7 {RATIO} (ref 2–5)
CO2 SERPL-SCNC: 26 MMOL/L (ref 23–29)
COMPLEXED PSA SERPL-MCNC: 0.33 NG/ML (ref 0–4)
CREAT SERPL-MCNC: 0.7 MG/DL (ref 0.5–1.4)
DIFFERENTIAL METHOD: ABNORMAL
EOSINOPHIL # BLD AUTO: 0.5 K/UL (ref 0–0.5)
EOSINOPHIL NFR BLD: 5.5 % (ref 0–8)
ERYTHROCYTE [DISTWIDTH] IN BLOOD BY AUTOMATED COUNT: 13.7 % (ref 11.5–14.5)
EST. GFR  (AFRICAN AMERICAN): >60 ML/MIN/1.73 M^2
EST. GFR  (NON AFRICAN AMERICAN): >60 ML/MIN/1.73 M^2
GLUCOSE SERPL-MCNC: 87 MG/DL (ref 70–110)
HCT VFR BLD AUTO: 37.8 % (ref 40–54)
HCV AB SERPL QL IA: NEGATIVE
HDLC SERPL-MCNC: 56 MG/DL (ref 40–75)
HDLC SERPL: 37.6 % (ref 20–50)
HGB BLD-MCNC: 12.3 G/DL (ref 14–18)
IMM GRANULOCYTES # BLD AUTO: 0.01 K/UL (ref 0–0.04)
IMM GRANULOCYTES NFR BLD AUTO: 0.1 % (ref 0–0.5)
LDLC SERPL CALC-MCNC: 77 MG/DL (ref 63–159)
LYMPHOCYTES # BLD AUTO: 2.6 K/UL (ref 1–4.8)
LYMPHOCYTES NFR BLD: 31.7 % (ref 18–48)
MCH RBC QN AUTO: 32.1 PG (ref 27–31)
MCHC RBC AUTO-ENTMCNC: 32.5 G/DL (ref 32–36)
MCV RBC AUTO: 99 FL (ref 82–98)
MONOCYTES # BLD AUTO: 0.8 K/UL (ref 0.3–1)
MONOCYTES NFR BLD: 9.4 % (ref 4–15)
NEUTROPHILS # BLD AUTO: 4.3 K/UL (ref 1.8–7.7)
NEUTROPHILS NFR BLD: 52.2 % (ref 38–73)
NONHDLC SERPL-MCNC: 93 MG/DL
NRBC BLD-RTO: 0 /100 WBC
PLATELET # BLD AUTO: 448 K/UL (ref 150–450)
PMV BLD AUTO: 10.4 FL (ref 9.2–12.9)
POTASSIUM SERPL-SCNC: 4.1 MMOL/L (ref 3.5–5.1)
PROT SERPL-MCNC: 7 G/DL (ref 6–8.4)
RBC # BLD AUTO: 3.83 M/UL (ref 4.6–6.2)
SODIUM SERPL-SCNC: 139 MMOL/L (ref 136–145)
TRIGL SERPL-MCNC: 80 MG/DL (ref 30–150)
TSH SERPL DL<=0.005 MIU/L-ACNC: 0.69 UIU/ML (ref 0.4–4)
WBC # BLD AUTO: 8.19 K/UL (ref 3.9–12.7)

## 2021-06-14 PROCEDURE — 84153 ASSAY OF PSA TOTAL: CPT | Performed by: STUDENT IN AN ORGANIZED HEALTH CARE EDUCATION/TRAINING PROGRAM

## 2021-06-14 PROCEDURE — 3008F BODY MASS INDEX DOCD: CPT | Mod: CPTII,S$GLB,, | Performed by: STUDENT IN AN ORGANIZED HEALTH CARE EDUCATION/TRAINING PROGRAM

## 2021-06-14 PROCEDURE — 84443 ASSAY THYROID STIM HORMONE: CPT | Performed by: STUDENT IN AN ORGANIZED HEALTH CARE EDUCATION/TRAINING PROGRAM

## 2021-06-14 PROCEDURE — 80061 LIPID PANEL: CPT | Performed by: STUDENT IN AN ORGANIZED HEALTH CARE EDUCATION/TRAINING PROGRAM

## 2021-06-14 PROCEDURE — 99999 PR PBB SHADOW E&M-EST. PATIENT-LVL V: CPT | Mod: PBBFAC,,, | Performed by: STUDENT IN AN ORGANIZED HEALTH CARE EDUCATION/TRAINING PROGRAM

## 2021-06-14 PROCEDURE — 99396 PREV VISIT EST AGE 40-64: CPT | Mod: S$GLB,,, | Performed by: STUDENT IN AN ORGANIZED HEALTH CARE EDUCATION/TRAINING PROGRAM

## 2021-06-14 PROCEDURE — 85025 COMPLETE CBC W/AUTO DIFF WBC: CPT | Performed by: STUDENT IN AN ORGANIZED HEALTH CARE EDUCATION/TRAINING PROGRAM

## 2021-06-14 PROCEDURE — 36415 COLL VENOUS BLD VENIPUNCTURE: CPT | Mod: PO | Performed by: STUDENT IN AN ORGANIZED HEALTH CARE EDUCATION/TRAINING PROGRAM

## 2021-06-14 PROCEDURE — 3008F PR BODY MASS INDEX (BMI) DOCUMENTED: ICD-10-PCS | Mod: CPTII,S$GLB,, | Performed by: STUDENT IN AN ORGANIZED HEALTH CARE EDUCATION/TRAINING PROGRAM

## 2021-06-14 PROCEDURE — 1126F PR PAIN SEVERITY QUANTIFIED, NO PAIN PRESENT: ICD-10-PCS | Mod: S$GLB,,, | Performed by: STUDENT IN AN ORGANIZED HEALTH CARE EDUCATION/TRAINING PROGRAM

## 2021-06-14 PROCEDURE — 86677 HELICOBACTER PYLORI ANTIBODY: CPT | Performed by: STUDENT IN AN ORGANIZED HEALTH CARE EDUCATION/TRAINING PROGRAM

## 2021-06-14 PROCEDURE — 99999 PR PBB SHADOW E&M-EST. PATIENT-LVL V: ICD-10-PCS | Mod: PBBFAC,,, | Performed by: STUDENT IN AN ORGANIZED HEALTH CARE EDUCATION/TRAINING PROGRAM

## 2021-06-14 PROCEDURE — 1126F AMNT PAIN NOTED NONE PRSNT: CPT | Mod: S$GLB,,, | Performed by: STUDENT IN AN ORGANIZED HEALTH CARE EDUCATION/TRAINING PROGRAM

## 2021-06-14 PROCEDURE — 99396 PR PREVENTIVE VISIT,EST,40-64: ICD-10-PCS | Mod: S$GLB,,, | Performed by: STUDENT IN AN ORGANIZED HEALTH CARE EDUCATION/TRAINING PROGRAM

## 2021-06-14 PROCEDURE — 86803 HEPATITIS C AB TEST: CPT | Performed by: STUDENT IN AN ORGANIZED HEALTH CARE EDUCATION/TRAINING PROGRAM

## 2021-06-14 PROCEDURE — 80053 COMPREHEN METABOLIC PANEL: CPT | Performed by: STUDENT IN AN ORGANIZED HEALTH CARE EDUCATION/TRAINING PROGRAM

## 2021-06-14 RX ORDER — BUPROPION HYDROCHLORIDE 300 MG/1
300 TABLET ORAL EVERY MORNING
Status: ON HOLD | COMMUNITY
Start: 2021-03-29 | End: 2021-09-17

## 2021-06-14 RX ORDER — DEXTROAMPHETAMINE SACCHARATE, AMPHETAMINE ASPARTATE MONOHYDRATE, DEXTROAMPHETAMINE SULFATE AND AMPHETAMINE SULFATE 5; 5; 5; 5 MG/1; MG/1; MG/1; MG/1
20 CAPSULE, EXTENDED RELEASE ORAL 2 TIMES DAILY
COMMUNITY
Start: 2021-05-05

## 2021-06-14 RX ORDER — POLYETHYLENE GLYCOL 3350 17 G/17G
17 POWDER, FOR SOLUTION ORAL DAILY
Qty: 30 EACH | Refills: 2 | Status: SHIPPED | OUTPATIENT
Start: 2021-06-14 | End: 2021-07-14

## 2021-06-14 RX ORDER — ONDANSETRON 4 MG/1
4 TABLET, FILM COATED ORAL EVERY 6 HOURS PRN
Qty: 60 TABLET | Refills: 1 | Status: SHIPPED | OUTPATIENT
Start: 2021-06-14 | End: 2021-06-17 | Stop reason: SDUPTHER

## 2021-06-15 LAB — H PYLORI IGG SERPL QL IA: NEGATIVE

## 2021-06-16 ENCOUNTER — TELEPHONE (OUTPATIENT)
Dept: FAMILY MEDICINE | Facility: CLINIC | Age: 61
End: 2021-06-16

## 2021-06-16 ENCOUNTER — LAB VISIT (OUTPATIENT)
Dept: LAB | Facility: HOSPITAL | Age: 61
End: 2021-06-16
Attending: STUDENT IN AN ORGANIZED HEALTH CARE EDUCATION/TRAINING PROGRAM
Payer: COMMERCIAL

## 2021-06-16 DIAGNOSIS — Z12.11 COLON CANCER SCREENING: ICD-10-CM

## 2021-06-16 PROCEDURE — 82274 ASSAY TEST FOR BLOOD FECAL: CPT | Performed by: STUDENT IN AN ORGANIZED HEALTH CARE EDUCATION/TRAINING PROGRAM

## 2021-06-17 ENCOUNTER — LAB VISIT (OUTPATIENT)
Dept: LAB | Facility: HOSPITAL | Age: 61
End: 2021-06-17
Attending: STUDENT IN AN ORGANIZED HEALTH CARE EDUCATION/TRAINING PROGRAM
Payer: COMMERCIAL

## 2021-06-17 DIAGNOSIS — D53.9 MACROCYTIC ANEMIA: ICD-10-CM

## 2021-06-17 DIAGNOSIS — R11.0 NAUSEA: ICD-10-CM

## 2021-06-17 LAB
FOLATE SERPL-MCNC: 9.4 NG/ML (ref 4–24)
VIT B12 SERPL-MCNC: 534 PG/ML (ref 210–950)

## 2021-06-17 PROCEDURE — 82607 VITAMIN B-12: CPT | Performed by: STUDENT IN AN ORGANIZED HEALTH CARE EDUCATION/TRAINING PROGRAM

## 2021-06-17 PROCEDURE — 36415 COLL VENOUS BLD VENIPUNCTURE: CPT | Mod: PO | Performed by: STUDENT IN AN ORGANIZED HEALTH CARE EDUCATION/TRAINING PROGRAM

## 2021-06-17 PROCEDURE — 82746 ASSAY OF FOLIC ACID SERUM: CPT | Performed by: STUDENT IN AN ORGANIZED HEALTH CARE EDUCATION/TRAINING PROGRAM

## 2021-06-17 RX ORDER — ONDANSETRON 4 MG/1
4 TABLET, FILM COATED ORAL EVERY 6 HOURS PRN
Qty: 60 TABLET | Refills: 1 | Status: SHIPPED | OUTPATIENT
Start: 2021-06-17 | End: 2021-08-25

## 2021-06-22 ENCOUNTER — HOSPITAL ENCOUNTER (OUTPATIENT)
Dept: RADIOLOGY | Facility: HOSPITAL | Age: 61
Discharge: HOME | End: 2021-06-22
Attending: STUDENT IN AN ORGANIZED HEALTH CARE EDUCATION/TRAINING PROGRAM
Payer: COMMERCIAL

## 2021-06-22 ENCOUNTER — PATIENT MESSAGE (OUTPATIENT)
Dept: FAMILY MEDICINE | Facility: CLINIC | Age: 61
End: 2021-06-22

## 2021-06-22 DIAGNOSIS — F17.210 NICOTINE DEPENDENCE, CIGARETTES, UNCOMPLICATED: ICD-10-CM

## 2021-06-22 DIAGNOSIS — R19.5 POSITIVE FIT (FECAL IMMUNOCHEMICAL TEST): Primary | ICD-10-CM

## 2021-06-22 DIAGNOSIS — F17.210 CIGARETTE NICOTINE DEPENDENCE WITHOUT COMPLICATION: ICD-10-CM

## 2021-06-22 LAB — HEMOCCULT STL QL IA: POSITIVE

## 2021-06-22 PROCEDURE — 71271 CT THORAX LUNG CANCER SCR C-: CPT | Mod: 26,,, | Performed by: RADIOLOGY

## 2021-06-22 PROCEDURE — 71271 CT CHEST LUNG SCREENING LOW DOSE: ICD-10-PCS | Mod: 26,,, | Performed by: RADIOLOGY

## 2021-06-22 PROCEDURE — 71271 CT THORAX LUNG CANCER SCR C-: CPT | Mod: TC

## 2021-06-23 ENCOUNTER — TELEPHONE (OUTPATIENT)
Dept: FAMILY MEDICINE | Facility: CLINIC | Age: 61
End: 2021-06-23

## 2021-06-23 ENCOUNTER — TELEPHONE (OUTPATIENT)
Dept: GASTROENTEROLOGY | Facility: CLINIC | Age: 61
End: 2021-06-23

## 2021-06-24 ENCOUNTER — TELEPHONE (OUTPATIENT)
Dept: FAMILY MEDICINE | Facility: CLINIC | Age: 61
End: 2021-06-24

## 2021-06-25 ENCOUNTER — OFFICE VISIT (OUTPATIENT)
Dept: GASTROENTEROLOGY | Facility: CLINIC | Age: 61
End: 2021-06-25
Payer: COMMERCIAL

## 2021-06-25 VITALS
WEIGHT: 140 LBS | HEART RATE: 87 BPM | SYSTOLIC BLOOD PRESSURE: 171 MMHG | BODY MASS INDEX: 17.97 KG/M2 | DIASTOLIC BLOOD PRESSURE: 89 MMHG

## 2021-06-25 DIAGNOSIS — Z86.010 HX OF COLONIC POLYPS: Primary | ICD-10-CM

## 2021-06-25 DIAGNOSIS — F11.20 OPIATE DEPENDENCE, CONTINUOUS: ICD-10-CM

## 2021-06-25 DIAGNOSIS — R11.2 NAUSEA AND VOMITING, INTRACTABILITY OF VOMITING NOT SPECIFIED, UNSPECIFIED VOMITING TYPE: ICD-10-CM

## 2021-06-25 DIAGNOSIS — G89.4 CHRONIC PAIN SYNDROME: ICD-10-CM

## 2021-06-25 DIAGNOSIS — R11.2 NAUSEA AND VOMITING, INTRACTABILITY OF VOMITING NOT SPECIFIED, UNSPECIFIED VOMITING TYPE: Primary | ICD-10-CM

## 2021-06-25 DIAGNOSIS — R19.5 POSITIVE FIT (FECAL IMMUNOCHEMICAL TEST): ICD-10-CM

## 2021-06-25 DIAGNOSIS — Z87.11 HISTORY OF PEPTIC ULCER: ICD-10-CM

## 2021-06-25 DIAGNOSIS — K21.9 GASTROESOPHAGEAL REFLUX DISEASE, UNSPECIFIED WHETHER ESOPHAGITIS PRESENT: ICD-10-CM

## 2021-06-25 DIAGNOSIS — K59.00 CONSTIPATION, UNSPECIFIED CONSTIPATION TYPE: Primary | ICD-10-CM

## 2021-06-25 DIAGNOSIS — D64.9 ANEMIA, UNSPECIFIED TYPE: ICD-10-CM

## 2021-06-25 DIAGNOSIS — Z86.010 HISTORY OF COLON POLYPS: ICD-10-CM

## 2021-06-25 DIAGNOSIS — K59.03 CONSTIPATION DUE TO PAIN MEDICATION: ICD-10-CM

## 2021-06-25 PROCEDURE — 99999 PR PBB SHADOW E&M-EST. PATIENT-LVL III: ICD-10-PCS | Mod: PBBFAC,,, | Performed by: INTERNAL MEDICINE

## 2021-06-25 PROCEDURE — 3008F PR BODY MASS INDEX (BMI) DOCUMENTED: ICD-10-PCS | Mod: CPTII,S$GLB,, | Performed by: INTERNAL MEDICINE

## 2021-06-25 PROCEDURE — 1126F PR PAIN SEVERITY QUANTIFIED, NO PAIN PRESENT: ICD-10-PCS | Mod: S$GLB,,, | Performed by: INTERNAL MEDICINE

## 2021-06-25 PROCEDURE — 99204 PR OFFICE/OUTPT VISIT, NEW, LEVL IV, 45-59 MIN: ICD-10-PCS | Mod: S$GLB,,, | Performed by: INTERNAL MEDICINE

## 2021-06-25 PROCEDURE — 3008F BODY MASS INDEX DOCD: CPT | Mod: CPTII,S$GLB,, | Performed by: INTERNAL MEDICINE

## 2021-06-25 PROCEDURE — 1126F AMNT PAIN NOTED NONE PRSNT: CPT | Mod: S$GLB,,, | Performed by: INTERNAL MEDICINE

## 2021-06-25 PROCEDURE — 99999 PR PBB SHADOW E&M-EST. PATIENT-LVL III: CPT | Mod: PBBFAC,,, | Performed by: INTERNAL MEDICINE

## 2021-06-25 PROCEDURE — 99204 OFFICE O/P NEW MOD 45 MIN: CPT | Mod: S$GLB,,, | Performed by: INTERNAL MEDICINE

## 2021-06-25 RX ORDER — PLECANATIDE 3 MG/1
3 TABLET ORAL DAILY
Qty: 90 TABLET | Refills: 3 | Status: SHIPPED | OUTPATIENT
Start: 2021-06-25 | End: 2021-12-14

## 2021-06-28 ENCOUNTER — TELEPHONE (OUTPATIENT)
Dept: GASTROENTEROLOGY | Facility: CLINIC | Age: 61
End: 2021-06-28

## 2021-06-29 ENCOUNTER — TELEPHONE (OUTPATIENT)
Dept: GASTROENTEROLOGY | Facility: CLINIC | Age: 61
End: 2021-06-29

## 2021-06-29 ENCOUNTER — PATIENT MESSAGE (OUTPATIENT)
Dept: GASTROENTEROLOGY | Facility: CLINIC | Age: 61
End: 2021-06-29

## 2021-07-02 ENCOUNTER — TELEPHONE (OUTPATIENT)
Dept: GASTROENTEROLOGY | Facility: CLINIC | Age: 61
End: 2021-07-02

## 2021-07-02 RX ORDER — NALDEMEDINE 0.2 MG/1
0.2 TABLET ORAL DAILY
Qty: 90 TABLET | Refills: 3 | Status: ON HOLD | OUTPATIENT
Start: 2021-07-02 | End: 2021-09-17

## 2021-07-06 ENCOUNTER — TELEPHONE (OUTPATIENT)
Dept: GASTROENTEROLOGY | Facility: CLINIC | Age: 61
End: 2021-07-06

## 2021-07-07 ENCOUNTER — TELEPHONE (OUTPATIENT)
Dept: GASTROENTEROLOGY | Facility: CLINIC | Age: 61
End: 2021-07-07

## 2021-07-16 ENCOUNTER — ANESTHESIA EVENT (OUTPATIENT)
Dept: ENDOSCOPY | Facility: HOSPITAL | Age: 61
End: 2021-07-16
Payer: COMMERCIAL

## 2021-07-16 ENCOUNTER — ANESTHESIA (OUTPATIENT)
Dept: ENDOSCOPY | Facility: HOSPITAL | Age: 61
End: 2021-07-16
Payer: COMMERCIAL

## 2021-07-16 ENCOUNTER — HOSPITAL ENCOUNTER (OUTPATIENT)
Facility: HOSPITAL | Age: 61
Discharge: HOME | End: 2021-07-16
Attending: INTERNAL MEDICINE | Admitting: INTERNAL MEDICINE
Payer: COMMERCIAL

## 2021-07-16 VITALS
RESPIRATION RATE: 12 BRPM | OXYGEN SATURATION: 98 % | TEMPERATURE: 98 F | DIASTOLIC BLOOD PRESSURE: 78 MMHG | SYSTOLIC BLOOD PRESSURE: 128 MMHG | HEART RATE: 90 BPM

## 2021-07-16 DIAGNOSIS — K44.9 HIATAL HERNIA: ICD-10-CM

## 2021-07-16 DIAGNOSIS — K29.60 EROSIVE GASTRITIS: Primary | ICD-10-CM

## 2021-07-16 DIAGNOSIS — R11.2 NAUSEA & VOMITING: ICD-10-CM

## 2021-07-16 PROCEDURE — 88305 TISSUE EXAM BY PATHOLOGIST: ICD-10-PCS | Mod: 26,,, | Performed by: PATHOLOGY

## 2021-07-16 PROCEDURE — 27201012 HC FORCEPS, HOT/COLD, DISP: Performed by: INTERNAL MEDICINE

## 2021-07-16 PROCEDURE — 88305 TISSUE EXAM BY PATHOLOGIST: CPT | Performed by: PATHOLOGY

## 2021-07-16 PROCEDURE — 43239 EGD BIOPSY SINGLE/MULTIPLE: CPT | Performed by: INTERNAL MEDICINE

## 2021-07-16 PROCEDURE — 25000003 PHARM REV CODE 250: Performed by: NURSE ANESTHETIST, CERTIFIED REGISTERED

## 2021-07-16 PROCEDURE — 25000003 PHARM REV CODE 250: Performed by: INTERNAL MEDICINE

## 2021-07-16 PROCEDURE — D9220A PRA ANESTHESIA: ICD-10-PCS | Mod: ,,, | Performed by: ANESTHESIOLOGY

## 2021-07-16 PROCEDURE — D9220A PRA ANESTHESIA: Mod: ,,, | Performed by: NURSE ANESTHETIST, CERTIFIED REGISTERED

## 2021-07-16 PROCEDURE — 43239 EGD BIOPSY SINGLE/MULTIPLE: CPT | Mod: ,,, | Performed by: INTERNAL MEDICINE

## 2021-07-16 PROCEDURE — 88305 TISSUE EXAM BY PATHOLOGIST: CPT | Mod: 26,,, | Performed by: PATHOLOGY

## 2021-07-16 PROCEDURE — D9220A PRA ANESTHESIA: Mod: ,,, | Performed by: ANESTHESIOLOGY

## 2021-07-16 PROCEDURE — 37000009 HC ANESTHESIA EA ADD 15 MINS: Performed by: INTERNAL MEDICINE

## 2021-07-16 PROCEDURE — 37000008 HC ANESTHESIA 1ST 15 MINUTES: Performed by: INTERNAL MEDICINE

## 2021-07-16 PROCEDURE — 43239 PR EGD, FLEX, W/BIOPSY, SGL/MULTI: ICD-10-PCS | Mod: ,,, | Performed by: INTERNAL MEDICINE

## 2021-07-16 PROCEDURE — D9220A PRA ANESTHESIA: ICD-10-PCS | Mod: ,,, | Performed by: NURSE ANESTHETIST, CERTIFIED REGISTERED

## 2021-07-16 PROCEDURE — 63600175 PHARM REV CODE 636 W HCPCS: Performed by: NURSE ANESTHETIST, CERTIFIED REGISTERED

## 2021-07-16 RX ORDER — LIDOCAINE HCL/PF 100 MG/5ML
SYRINGE (ML) INTRAVENOUS
Status: DISCONTINUED | OUTPATIENT
Start: 2021-07-16 | End: 2021-07-16

## 2021-07-16 RX ORDER — PROPOFOL 10 MG/ML
VIAL (ML) INTRAVENOUS
Status: DISCONTINUED | OUTPATIENT
Start: 2021-07-16 | End: 2021-07-16

## 2021-07-16 RX ORDER — PANTOPRAZOLE SODIUM 40 MG/1
40 TABLET, DELAYED RELEASE ORAL DAILY
Qty: 90 TABLET | Refills: 3 | Status: SHIPPED | OUTPATIENT
Start: 2021-07-16 | End: 2024-03-21 | Stop reason: SDUPTHER

## 2021-07-16 RX ORDER — SUCRALFATE 1 G/1
1 TABLET ORAL
Qty: 360 TABLET | Refills: 0 | Status: SHIPPED | OUTPATIENT
Start: 2021-07-16 | End: 2021-10-14

## 2021-07-16 RX ORDER — SODIUM CHLORIDE 9 MG/ML
INJECTION, SOLUTION INTRAVENOUS CONTINUOUS
Status: DISCONTINUED | OUTPATIENT
Start: 2021-07-16 | End: 2021-07-16 | Stop reason: HOSPADM

## 2021-07-16 RX ADMIN — PROPOFOL 30 MG: 10 INJECTION, EMULSION INTRAVENOUS at 12:07

## 2021-07-16 RX ADMIN — LIDOCAINE HYDROCHLORIDE 100 MG: 20 INJECTION INTRAVENOUS at 12:07

## 2021-07-16 RX ADMIN — SODIUM CHLORIDE: 0.9 INJECTION, SOLUTION INTRAVENOUS at 11:07

## 2021-07-16 RX ADMIN — PROPOFOL 150 MG: 10 INJECTION, EMULSION INTRAVENOUS at 12:07

## 2021-07-23 LAB
FINAL PATHOLOGIC DIAGNOSIS: NORMAL
GROSS: NORMAL
Lab: NORMAL

## 2021-07-27 ENCOUNTER — TELEPHONE (OUTPATIENT)
Dept: GASTROENTEROLOGY | Facility: CLINIC | Age: 61
End: 2021-07-27

## 2021-07-30 ENCOUNTER — TELEPHONE (OUTPATIENT)
Dept: GASTROENTEROLOGY | Facility: CLINIC | Age: 61
End: 2021-07-30

## 2021-08-16 ENCOUNTER — TELEPHONE (OUTPATIENT)
Dept: GASTROENTEROLOGY | Facility: CLINIC | Age: 61
End: 2021-08-16

## 2021-08-17 ENCOUNTER — TELEPHONE (OUTPATIENT)
Dept: GASTROENTEROLOGY | Facility: CLINIC | Age: 61
End: 2021-08-17

## 2021-08-18 ENCOUNTER — PATIENT MESSAGE (OUTPATIENT)
Dept: GASTROENTEROLOGY | Facility: CLINIC | Age: 61
End: 2021-08-18

## 2021-08-18 ENCOUNTER — TELEPHONE (OUTPATIENT)
Dept: GASTROENTEROLOGY | Facility: CLINIC | Age: 61
End: 2021-08-18

## 2021-08-24 DIAGNOSIS — R11.0 NAUSEA: ICD-10-CM

## 2021-08-25 ENCOUNTER — PATIENT MESSAGE (OUTPATIENT)
Dept: GASTROENTEROLOGY | Facility: CLINIC | Age: 61
End: 2021-08-25

## 2021-08-25 DIAGNOSIS — Z86.010 HX OF COLONIC POLYPS: Primary | ICD-10-CM

## 2021-08-25 RX ORDER — ONDANSETRON 4 MG/1
TABLET, FILM COATED ORAL
Qty: 60 TABLET | Refills: 1 | Status: SHIPPED | OUTPATIENT
Start: 2021-08-25 | End: 2021-09-13

## 2021-09-10 DIAGNOSIS — R11.0 NAUSEA: ICD-10-CM

## 2021-09-13 RX ORDER — ONDANSETRON 4 MG/1
TABLET, FILM COATED ORAL
Qty: 60 TABLET | Refills: 0 | Status: SHIPPED | OUTPATIENT
Start: 2021-09-13 | End: 2021-10-10 | Stop reason: SDUPTHER

## 2021-09-17 ENCOUNTER — HOSPITAL ENCOUNTER (OUTPATIENT)
Facility: HOSPITAL | Age: 61
Discharge: HOME | End: 2021-09-17
Attending: INTERNAL MEDICINE | Admitting: INTERNAL MEDICINE
Payer: COMMERCIAL

## 2021-09-17 ENCOUNTER — ANESTHESIA EVENT (OUTPATIENT)
Dept: ENDOSCOPY | Facility: HOSPITAL | Age: 61
End: 2021-09-17
Payer: COMMERCIAL

## 2021-09-17 ENCOUNTER — ANESTHESIA (OUTPATIENT)
Dept: ENDOSCOPY | Facility: HOSPITAL | Age: 61
End: 2021-09-17
Payer: COMMERCIAL

## 2021-09-17 VITALS
DIASTOLIC BLOOD PRESSURE: 80 MMHG | RESPIRATION RATE: 18 BRPM | BODY MASS INDEX: 18.61 KG/M2 | OXYGEN SATURATION: 95 % | SYSTOLIC BLOOD PRESSURE: 157 MMHG | WEIGHT: 145 LBS | HEIGHT: 74 IN | TEMPERATURE: 98 F | HEART RATE: 77 BPM

## 2021-09-17 DIAGNOSIS — Z86.010 HISTORY OF COLON POLYPS: ICD-10-CM

## 2021-09-17 PROBLEM — Z86.0100 HISTORY OF COLON POLYPS: Status: ACTIVE | Noted: 2021-09-17

## 2021-09-17 LAB — SARS-COV-2 RDRP RESP QL NAA+PROBE: NEGATIVE

## 2021-09-17 PROCEDURE — D9220A PRA ANESTHESIA: ICD-10-PCS | Mod: ,,, | Performed by: ANESTHESIOLOGY

## 2021-09-17 PROCEDURE — G0105 COLORECTAL SCRN; HI RISK IND: HCPCS | Mod: ,,, | Performed by: INTERNAL MEDICINE

## 2021-09-17 PROCEDURE — G0105 COLORECTAL SCRN; HI RISK IND: HCPCS | Performed by: INTERNAL MEDICINE

## 2021-09-17 PROCEDURE — 63600175 PHARM REV CODE 636 W HCPCS: Performed by: NURSE ANESTHETIST, CERTIFIED REGISTERED

## 2021-09-17 PROCEDURE — G0105 COLORECTAL SCRN; HI RISK IND: ICD-10-PCS | Mod: ,,, | Performed by: INTERNAL MEDICINE

## 2021-09-17 PROCEDURE — D9220A PRA ANESTHESIA: Mod: ,,, | Performed by: ANESTHESIOLOGY

## 2021-09-17 PROCEDURE — 37000008 HC ANESTHESIA 1ST 15 MINUTES: Performed by: INTERNAL MEDICINE

## 2021-09-17 PROCEDURE — D9220A PRA ANESTHESIA: ICD-10-PCS | Mod: ,,, | Performed by: NURSE ANESTHETIST, CERTIFIED REGISTERED

## 2021-09-17 PROCEDURE — 25000003 PHARM REV CODE 250: Performed by: INTERNAL MEDICINE

## 2021-09-17 PROCEDURE — 25000003 PHARM REV CODE 250: Performed by: NURSE ANESTHETIST, CERTIFIED REGISTERED

## 2021-09-17 PROCEDURE — D9220A PRA ANESTHESIA: Mod: ,,, | Performed by: NURSE ANESTHETIST, CERTIFIED REGISTERED

## 2021-09-17 PROCEDURE — U0002 COVID-19 LAB TEST NON-CDC: HCPCS | Performed by: INTERNAL MEDICINE

## 2021-09-17 PROCEDURE — 37000009 HC ANESTHESIA EA ADD 15 MINS: Performed by: INTERNAL MEDICINE

## 2021-09-17 RX ORDER — LIDOCAINE HCL/PF 100 MG/5ML
SYRINGE (ML) INTRAVENOUS
Status: DISCONTINUED | OUTPATIENT
Start: 2021-09-17 | End: 2021-09-17

## 2021-09-17 RX ORDER — PROPOFOL 10 MG/ML
VIAL (ML) INTRAVENOUS
Status: DISCONTINUED | OUTPATIENT
Start: 2021-09-17 | End: 2021-09-17

## 2021-09-17 RX ORDER — SODIUM CHLORIDE 9 MG/ML
INJECTION, SOLUTION INTRAVENOUS CONTINUOUS
Status: DISCONTINUED | OUTPATIENT
Start: 2021-09-17 | End: 2021-09-17 | Stop reason: HOSPADM

## 2021-09-17 RX ADMIN — PROPOFOL 50 MG: 10 INJECTION, EMULSION INTRAVENOUS at 10:09

## 2021-09-17 RX ADMIN — LIDOCAINE HYDROCHLORIDE 50 MG: 20 INJECTION INTRAVENOUS at 09:09

## 2021-09-17 RX ADMIN — SODIUM CHLORIDE: 0.9 INJECTION, SOLUTION INTRAVENOUS at 09:09

## 2021-09-17 RX ADMIN — PROPOFOL 150 MG: 10 INJECTION, EMULSION INTRAVENOUS at 09:09

## 2021-10-10 DIAGNOSIS — R11.0 NAUSEA: ICD-10-CM

## 2021-10-11 RX ORDER — ONDANSETRON 4 MG/1
4 TABLET, FILM COATED ORAL EVERY 6 HOURS PRN
Qty: 60 TABLET | Refills: 1 | Status: SHIPPED | OUTPATIENT
Start: 2021-10-11 | End: 2021-11-02

## 2021-10-19 ENCOUNTER — TELEPHONE (OUTPATIENT)
Dept: FAMILY MEDICINE | Facility: CLINIC | Age: 61
End: 2021-10-19

## 2021-10-19 ENCOUNTER — TELEPHONE (OUTPATIENT)
Dept: GASTROENTEROLOGY | Facility: CLINIC | Age: 61
End: 2021-10-19

## 2021-10-19 DIAGNOSIS — Z86.010 HX OF COLONIC POLYPS: Primary | ICD-10-CM

## 2021-10-19 DIAGNOSIS — R11.0 NAUSEA: ICD-10-CM

## 2021-12-14 ENCOUNTER — HOSPITAL ENCOUNTER (OUTPATIENT)
Dept: RADIOLOGY | Facility: CLINIC | Age: 61
Discharge: HOME | End: 2021-12-14
Attending: STUDENT IN AN ORGANIZED HEALTH CARE EDUCATION/TRAINING PROGRAM
Payer: COMMERCIAL

## 2021-12-14 ENCOUNTER — OFFICE VISIT (OUTPATIENT)
Dept: FAMILY MEDICINE | Facility: CLINIC | Age: 61
End: 2021-12-14
Payer: COMMERCIAL

## 2021-12-14 VITALS
HEIGHT: 74 IN | HEART RATE: 80 BPM | BODY MASS INDEX: 17.26 KG/M2 | OXYGEN SATURATION: 98 % | TEMPERATURE: 98 F | WEIGHT: 134.5 LBS | SYSTOLIC BLOOD PRESSURE: 134 MMHG | RESPIRATION RATE: 16 BRPM | DIASTOLIC BLOOD PRESSURE: 80 MMHG

## 2021-12-14 DIAGNOSIS — M25.561 CHRONIC PAIN OF RIGHT KNEE: ICD-10-CM

## 2021-12-14 DIAGNOSIS — Z00.00 HEALTHCARE MAINTENANCE: ICD-10-CM

## 2021-12-14 DIAGNOSIS — F17.210 CIGARETTE NICOTINE DEPENDENCE WITHOUT COMPLICATION: ICD-10-CM

## 2021-12-14 DIAGNOSIS — M54.2 CHRONIC NECK PAIN: ICD-10-CM

## 2021-12-14 DIAGNOSIS — G89.4 CHRONIC PAIN SYNDROME: ICD-10-CM

## 2021-12-14 DIAGNOSIS — K59.00 CONSTIPATION, UNSPECIFIED CONSTIPATION TYPE: ICD-10-CM

## 2021-12-14 DIAGNOSIS — G89.29 CHRONIC NECK PAIN: ICD-10-CM

## 2021-12-14 DIAGNOSIS — G89.29 CHRONIC PAIN OF RIGHT KNEE: Primary | ICD-10-CM

## 2021-12-14 DIAGNOSIS — K21.9 GASTROESOPHAGEAL REFLUX DISEASE, UNSPECIFIED WHETHER ESOPHAGITIS PRESENT: ICD-10-CM

## 2021-12-14 DIAGNOSIS — D53.9 MACROCYTIC ANEMIA: ICD-10-CM

## 2021-12-14 DIAGNOSIS — M25.561 CHRONIC PAIN OF RIGHT KNEE: Primary | ICD-10-CM

## 2021-12-14 DIAGNOSIS — G89.29 CHRONIC PAIN OF RIGHT KNEE: ICD-10-CM

## 2021-12-14 PROCEDURE — 73562 XR KNEE 3 VIEW RIGHT: ICD-10-PCS | Mod: 26,RT,S$GLB, | Performed by: RADIOLOGY

## 2021-12-14 PROCEDURE — 73562 X-RAY EXAM OF KNEE 3: CPT | Mod: TC,FY,PO,RT

## 2021-12-14 PROCEDURE — 99999 PR PBB SHADOW E&M-EST. PATIENT-LVL V: CPT | Mod: PBBFAC,,, | Performed by: STUDENT IN AN ORGANIZED HEALTH CARE EDUCATION/TRAINING PROGRAM

## 2021-12-14 PROCEDURE — 73562 X-RAY EXAM OF KNEE 3: CPT | Mod: 26,RT,S$GLB, | Performed by: RADIOLOGY

## 2021-12-14 PROCEDURE — 99999 PR PBB SHADOW E&M-EST. PATIENT-LVL V: ICD-10-PCS | Mod: PBBFAC,,, | Performed by: STUDENT IN AN ORGANIZED HEALTH CARE EDUCATION/TRAINING PROGRAM

## 2021-12-14 PROCEDURE — 99214 OFFICE O/P EST MOD 30 MIN: CPT | Mod: S$GLB,,, | Performed by: STUDENT IN AN ORGANIZED HEALTH CARE EDUCATION/TRAINING PROGRAM

## 2021-12-14 PROCEDURE — 99214 PR OFFICE/OUTPT VISIT, EST, LEVL IV, 30-39 MIN: ICD-10-PCS | Mod: S$GLB,,, | Performed by: STUDENT IN AN ORGANIZED HEALTH CARE EDUCATION/TRAINING PROGRAM

## 2021-12-14 RX ORDER — TIZANIDINE 2 MG/1
2 TABLET ORAL EVERY 8 HOURS PRN
Qty: 60 TABLET | Refills: 1 | Status: SHIPPED | OUTPATIENT
Start: 2021-12-14 | End: 2022-01-13

## 2022-03-01 ENCOUNTER — PATIENT MESSAGE (OUTPATIENT)
Dept: GASTROENTEROLOGY | Facility: CLINIC | Age: 62
End: 2022-03-01
Payer: COMMERCIAL

## 2022-03-01 ENCOUNTER — PATIENT MESSAGE (OUTPATIENT)
Dept: FAMILY MEDICINE | Facility: CLINIC | Age: 62
End: 2022-03-01
Payer: COMMERCIAL

## 2022-03-01 DIAGNOSIS — R11.0 NAUSEA: ICD-10-CM

## 2022-03-02 RX ORDER — ONDANSETRON 4 MG/1
4 TABLET, FILM COATED ORAL EVERY 6 HOURS PRN
Qty: 60 TABLET | Refills: 1 | Status: SHIPPED | OUTPATIENT
Start: 2022-03-02 | End: 2023-09-13

## 2022-03-02 NOTE — TELEPHONE ENCOUNTER
No new care gaps identified.  Powered by [employer]. Reference number: [id number].   3/02/2022 7:17:53 AM CST

## 2022-05-12 ENCOUNTER — PATIENT MESSAGE (OUTPATIENT)
Dept: SMOKING CESSATION | Facility: CLINIC | Age: 62
End: 2022-05-12
Payer: COMMERCIAL

## 2023-03-03 ENCOUNTER — HOSPITAL ENCOUNTER (OUTPATIENT)
Dept: PREADMISSION TESTING | Facility: HOSPITAL | Age: 63
Discharge: HOME | End: 2023-03-03
Attending: INTERNAL MEDICINE
Payer: COMMERCIAL

## 2023-03-03 VITALS
HEIGHT: 74 IN | SYSTOLIC BLOOD PRESSURE: 168 MMHG | RESPIRATION RATE: 18 BRPM | OXYGEN SATURATION: 99 % | HEART RATE: 84 BPM | DIASTOLIC BLOOD PRESSURE: 90 MMHG | BODY MASS INDEX: 17.26 KG/M2 | WEIGHT: 134.5 LBS | TEMPERATURE: 98 F

## 2023-03-03 DIAGNOSIS — Z01.818 PRE-OP TESTING: Primary | ICD-10-CM

## 2023-03-03 PROCEDURE — 93010 ELECTROCARDIOGRAM REPORT: CPT | Mod: ,,, | Performed by: INTERNAL MEDICINE

## 2023-03-03 PROCEDURE — 93005 ELECTROCARDIOGRAM TRACING: CPT | Performed by: INTERNAL MEDICINE

## 2023-03-03 PROCEDURE — 93010 EKG 12-LEAD: ICD-10-PCS | Mod: ,,, | Performed by: INTERNAL MEDICINE

## 2023-03-03 RX ORDER — OXYCODONE AND ACETAMINOPHEN 7.5; 325 MG/1; MG/1
TABLET ORAL
COMMUNITY
Start: 2023-02-15 | End: 2023-09-13

## 2023-03-03 RX ORDER — ZOLPIDEM TARTRATE 10 MG/1
10 TABLET ORAL
COMMUNITY
Start: 2023-02-20 | End: 2024-03-21

## 2023-03-03 RX ORDER — OMEPRAZOLE 20 MG/1
20 CAPSULE, DELAYED RELEASE ORAL 2 TIMES DAILY
COMMUNITY
End: 2024-03-21

## 2023-03-03 NOTE — PRE-PROCEDURE INSTRUCTIONS
Pre procedure instructions given after review of history and medications.  NPO after midnight prior to procedure.  Advised not to smoke DOS.  \[facility]\"" will not take any meds am of procedure.  \[facility]\"" has Colonoscopy instructions (clear liquids and colon prep)

## 2023-03-03 NOTE — DISCHARGE INSTRUCTIONS
To confirm, Your doctor has instructed you that procedure is scheduled for: March 7     1 Person can come with you the day of surgery     Endoscopy nurse will call the afternoon prior to surgery with your arrival time.      Please  Enter at [facility] [address] and come through front entrance.       Check in at registration.     After registration, proceed past gift shop and through glass door ( [facility]) Check in at the nurses station to the left.     FOLLOW CLEAR LIQUIDS AND COLON PREP AS DIRECTED BY PHYSICIAN      Do not eat or drink anything after midnight the night before your surgery - THIS INCLUDES  WATER, GUM, MINTS AND CANDY.  YOU MAY BRUSH YOUR TEETH BUT DO NOT SWALLOW       TAKE ONLY THESE MEDICATIONS WITH A SMALL SIP OF WATER THE MORNING OF YOUR PROCEDURE: NONE      PLEASE NOTE:  The surgery schedule has many variables which may affect the time of your surgery case.  Family members should be available if your surgery time changes.  Plan to be here the day of your procedure between 4-6 hours.      DO NOT TAKE THESE MEDICATIONS 5-7 DAYS PRIOR to your procedure or per your surgeon's request: ASPIRIN, ALEVE, ADVIL, IBUPROFEN,  CORNELIA SELTZER, BC , FISH OIL , VITAMIN E, HERBALS  (May take Tylenol)                                                        IMPORTANT INSTRUCTIONS      Do not smoke, vape or drink alcoholic beverages 24 hours prior to your procedure.  Shower the night before  and sleep in a bed with clean sheets.  Do not sleep with a pet in the bed.       If you wear contact lenses, dentures, hearing aids or glasses, bring a container to put them in during surgery and give to a family member for safe keeping.    Please leave all jewelry, piercing's and valuables at home.   Wear rubber soled shoes (no flip flops).  ONLY for patients requiring bowel prep, written instructions will be given by your doctor's office.  If your doctor has scheduled you for an overnight stay, bring a small overnight bag  with any personal items you need.    Make arrangements in advance for transportation home by a responsible adult.      You must make arrangements for transportation, TAXI'S, UBER'S OR LYFTS ARE NOT ALLOWED.        If you have any questions about these instructions, call (Monday - Friday)  [phone]

## 2023-03-07 ENCOUNTER — ANESTHESIA (OUTPATIENT)
Dept: SURGERY | Facility: HOSPITAL | Age: 63
End: 2023-03-07
Payer: COMMERCIAL

## 2023-03-07 ENCOUNTER — HOSPITAL ENCOUNTER (OUTPATIENT)
Facility: HOSPITAL | Age: 63
Discharge: HOME | End: 2023-03-07
Attending: INTERNAL MEDICINE | Admitting: INTERNAL MEDICINE
Payer: COMMERCIAL

## 2023-03-07 ENCOUNTER — ANESTHESIA EVENT (OUTPATIENT)
Dept: SURGERY | Facility: HOSPITAL | Age: 63
End: 2023-03-07
Payer: COMMERCIAL

## 2023-03-07 VITALS
DIASTOLIC BLOOD PRESSURE: 82 MMHG | TEMPERATURE: 98 F | OXYGEN SATURATION: 98 % | RESPIRATION RATE: 18 BRPM | HEART RATE: 77 BPM | SYSTOLIC BLOOD PRESSURE: 143 MMHG

## 2023-03-07 DIAGNOSIS — Z86.010 PERSONAL HISTORY OF COLONIC POLYPS: ICD-10-CM

## 2023-03-07 PROCEDURE — 25000003 PHARM REV CODE 250: Performed by: INTERNAL MEDICINE

## 2023-03-07 PROCEDURE — 25000003 PHARM REV CODE 250: Performed by: NURSE ANESTHETIST, CERTIFIED REGISTERED

## 2023-03-07 PROCEDURE — D9220A PRA ANESTHESIA: ICD-10-PCS | Mod: 33,ANES,, | Performed by: STUDENT IN AN ORGANIZED HEALTH CARE EDUCATION/TRAINING PROGRAM

## 2023-03-07 PROCEDURE — 45385 COLONOSCOPY W/LESION REMOVAL: CPT | Performed by: INTERNAL MEDICINE

## 2023-03-07 PROCEDURE — 27201114 HC TRAP (ANY): Performed by: INTERNAL MEDICINE

## 2023-03-07 PROCEDURE — D9220A PRA ANESTHESIA: ICD-10-PCS | Mod: 33,CRNA,, | Performed by: NURSE ANESTHETIST, CERTIFIED REGISTERED

## 2023-03-07 PROCEDURE — 63600175 PHARM REV CODE 636 W HCPCS: Performed by: NURSE ANESTHETIST, CERTIFIED REGISTERED

## 2023-03-07 PROCEDURE — D9220A PRA ANESTHESIA: Mod: 33,CRNA,, | Performed by: NURSE ANESTHETIST, CERTIFIED REGISTERED

## 2023-03-07 PROCEDURE — 37000009 HC ANESTHESIA EA ADD 15 MINS: Performed by: INTERNAL MEDICINE

## 2023-03-07 PROCEDURE — D9220A PRA ANESTHESIA: Mod: 33,ANES,, | Performed by: STUDENT IN AN ORGANIZED HEALTH CARE EDUCATION/TRAINING PROGRAM

## 2023-03-07 PROCEDURE — 37000008 HC ANESTHESIA 1ST 15 MINUTES: Performed by: INTERNAL MEDICINE

## 2023-03-07 PROCEDURE — 27201089 HC SNARE, DISP (ANY): Performed by: INTERNAL MEDICINE

## 2023-03-07 RX ORDER — PROPOFOL 10 MG/ML
INJECTION, EMULSION INTRAVENOUS
Status: DISCONTINUED | OUTPATIENT
Start: 2023-03-07 | End: 2023-03-07

## 2023-03-07 RX ORDER — LIDOCAINE HCL/PF 100 MG/5ML
SYRINGE (ML) INTRAVENOUS
Status: DISCONTINUED | OUTPATIENT
Start: 2023-03-07 | End: 2023-03-07

## 2023-03-07 RX ADMIN — PROPOFOL 30 MG: 10 INJECTION, EMULSION INTRAVENOUS at 09:03

## 2023-03-07 RX ADMIN — LIDOCAINE HYDROCHLORIDE 50 MG: 20 INJECTION, SOLUTION INTRAVENOUS at 09:03

## 2023-03-07 RX ADMIN — SODIUM CHLORIDE, SODIUM LACTATE, POTASSIUM CHLORIDE, AND CALCIUM CHLORIDE: .6; .31; .03; .02 INJECTION, SOLUTION INTRAVENOUS at 09:03

## 2023-03-07 RX ADMIN — PROPOFOL 100 MG: 10 INJECTION, EMULSION INTRAVENOUS at 09:03

## 2023-03-07 NOTE — TRANSFER OF CARE
Anesthesia Transfer of Care Note    Patient: Beka Bee    Procedure(s) Performed: Procedure(s) (LRB):  COLONOSCOPY (N/A)    Patient location: GI    Anesthesia Type: MAC    Transport from OR: Transported from OR on 2-3 L/min O2 by NC with adequate spontaneous ventilation    Post pain: adequate analgesia    Post assessment: no apparent anesthetic complications    Post vital signs: stable    Level of consciousness: awake    Nausea/Vomiting: no nausea/vomiting    Complications: none    Transfer of care protocol was followed      Last vitals:   Visit Vitals  BP (!) 146/73   Pulse 84   Temp 36.8 °C (98.2 °F)   Resp 16   SpO2 99%

## 2023-03-07 NOTE — [employer] PATIENT INSTRUCTIONS
Discharge Summary/Instructions after an Endoscopic Procedure  Patient Name: Beka Bee  Patient MRN: [id number]  Patient YOB: 1960 Tuesday, March 7, 2023  Jorge A Pereyra III, MD  RESTRICTIONS:  During your procedure today, you received medications for sedation.  These   medications may affect your judgment, balance and coordination.  Therefore,   for 24 hours, you have the following restrictions:   - DO NOT drive a car, operate machinery, make legal/financial decisions,   sign important papers or drink alcohol.    ACTIVITY:  Today: no heavy lifting, straining or running due to procedural   sedation/anesthesia.  The following day: return to full activity including work.  DIET:  Eat and drink normally unless instructed otherwise.     TREATMENT FOR COMMON SIDE EFFECTS:  - Mild abdominal pain, nausea, belching, bloating or excessive gas:  rest,   eat lightly and use a heating pad.  - Sore Throat: treat with throat lozenges and/or gargle with warm salt   water.  - Because air was used during the procedure, expelling large amounts of air   from your rectum or belching is normal.  - If a bowel prep was taken, you may not have a bowel movement for 1-3 days.    This is normal.  SYMPTOMS TO WATCH FOR AND REPORT TO YOUR PHYSICIAN:  1. Abdominal pain or bloating, other than gas cramps.  2. Chest pain.  3. Back pain.  4. Signs of infection such as: chills or fever occurring within 24 hours   after the procedure.  5. Rectal bleeding, which would show as bright red, maroon, or black stools.   (A tablespoon of blood from the rectum is not serious, especially if   hemorrhoids are present.)  6. Vomiting.  7. Weakness or dizziness.  GO DIRECTLY TO THE NEAREST EMERGENCY ROOM IF YOU HAVE ANY OF THE FOLLOWING:      Difficulty breathing              Chills and/or fever over 101 F   Persistent vomiting and/or vomiting blood   Severe abdominal pain   Severe chest pain   Black, tarry stools   Bleeding- more than one  tablespoon   Any other symptom or condition that you feel may need urgent attention  Your doctor recommends these additional instructions:  If any biopsies were taken, your doctors clinic will contact you in 1 to 2   weeks with any results.  - Patient has a contact number available for emergencies.  The signs and   symptoms of potential delayed complications were discussed with the   patient.  Return to normal activities tomorrow.  Written discharge   instructions were provided to the patient.   - Resume previous diet.   - Continue present medications.   - Repeat colonoscopy in 5 years for surveillance.   - Discharge patient to home (with escort).  For questions, problems or results please call your physician - Jorge A Pereyra III, MD at Work:  [phone].  [facility], EMERGENCY ROOM PHONE NUMBER: [phone]  IF A COMPLICATION OR EMERGENCY SITUATION ARISES AND YOU ARE UNABLE TO REACH   YOUR PHYSICIAN - GO DIRECTLY TO THE EMERGENCY ROOM.  Jorge A Pereyra III, MD  3/7/2023 9:53:37 AM  This report has been verified and signed electronically.  Dear patient,  As a result of recent federal legislation (The Federal Cures Act), you may   receive lab or pathology results from your procedure in your [facility]   account before your physician is able to contact you. Your physician or   their representative will relay the results to you with their   recommendations at their soonest availability.  Thank you,  [employer]

## 2023-03-07 NOTE — ANESTHESIA POSTPROCEDURE EVALUATION
Anesthesia Post Evaluation    Patient: Beka Bee    Procedure(s) Performed: Procedure(s) (LRB):  COLONOSCOPY (N/A)    Final Anesthesia Type: MAC      Patient location during evaluation: GI PACU  Patient participation: Yes- Able to Participate  Level of consciousness: awake and alert  Post-procedure vital signs: reviewed and stable  Pain management: adequate  Airway patency: patent    PONV status at discharge: No PONV  Anesthetic complications: no      Cardiovascular status: hemodynamically stable  Respiratory status: unassisted, spontaneous ventilation and room air  Hydration status: euvolemic  Follow-up not needed.          Vitals Value Taken Time   /82 03/07/23 1022   Temp 36.6 °C (97.9 °F) 03/07/23 0958   Pulse 76 03/07/23 1024   Resp 18 03/07/23 1022   SpO2 100 % 03/07/23 1024   Vitals shown include unvalidated device data.      No case tracking events are documented in the log.      Pain/Michelle Score: Michelle Score: 10 (3/7/2023 10:02 AM)

## 2023-03-07 NOTE — H&P
GASTROENTEROLOGY PRE-PROCEDURE H&P NOTE  Patient Name: Beka Bee  Patient MRN: [id number]  Patient : 1960    Service date: 3/7/2023    PCP: Jv France MD    No chief complaint on file.      HPI: Patient is a 62 y.o. male with PMHx as below here for evaluation of screening / h/o polyps.     Past Medical History:  Past Medical History:   Diagnosis Date    Digestive disorder     colon polyps    PUD (peptic ulcer disease)     blood transfusion        Past Surgical History:  Past Surgical History:   Procedure Laterality Date    COLONOSCOPY N/A 2021    Procedure: COLONOSCOPY;  Surgeon: Jesse Ambrosio MD;  Location: [facility];  Service: Endoscopy;  Laterality: N/A;    ESOPHAGOGASTRODUODENOSCOPY N/A 2021    Procedure: EGD (ESOPHAGOGASTRODUODENOSCOPY);  Surgeon: Jesse Ambrosio MD;  Location: [facility];  Service: Endoscopy;  Laterality: N/A;    KNEE ARTHROSCOPY Right         Home Medications:  Medications Prior to Admission   Medication Sig Dispense Refill Last Dose    dextroamphetamine-amphetamine (ADDERALL XR) 20 MG 24 hr capsule Take 20 mg by mouth 2 (two) times a day.        omeprazole (PRILOSEC) 20 MG capsule Take 20 mg by mouth 2 (two) times a day.       ondansetron (ZOFRAN) 4 MG tablet Take 1 tablet (4 mg total) by mouth every 6 (six) hours as needed. 60 tablet 1     oxyCODONE-acetaminophen (PERCOCET) 7.5-325 mg per tablet TAKE 1 TABLET BY MOUTH EVERY TWELVE HOURS AS NEEDED       pantoprazole (PROTONIX) 40 MG tablet Take 1 tablet (40 mg total) by mouth once daily. 90 tablet 3     senna-docusate 8.6-50 mg (SENNA WITH DOCUSATE SODIUM) 8.6-50 mg per tablet Take 1 tablet by mouth 2 (two) times daily. (Patient not taking: No sig reported) 60 tablet 5     zolpidem (AMBIEN) 10 mg Tab Take 10 mg by mouth.          Inpatient Medications:        Review of patient's allergies indicates:   Allergen Reactions    Aspirin      Gastric ulcers       Social History:   Social History      Occupational History    Not on file   Tobacco Use    Smoking status: Every Day     Packs/day: 0.50     Types: Cigarettes    Smokeless tobacco: Never    Tobacco comments:     Advised not to smoke DOS   Substance and Sexual Activity    Alcohol use: Yes     Alcohol/week: 1.0 standard drink     Types: 1 Cans of beer per week     Comment: rare    Drug use: Yes     Types: Oxycodone, Hydrocodone    Sexual activity: Yes     Partners: Female       Family History:   Family History   Problem Relation Age of Onset    No Known Problems Mother     Cancer Father        Review of Systems:  A 10 point review of systems was performed and was normal, except as mentioned in the HPI, including constitutional, HEENT, heme, lymph, cardiovascular, respiratory, gastrointestinal, genitourinary, neurologic, endocrine, psychiatric and musculoskeletal.      OBJECTIVE:    Physical Exam:  24 Hour Vital Sign Ranges: Temp:  [98.2 °F (36.8 °C)] 98.2 °F (36.8 °C)  Pulse:  [84] 84  Resp:  [16] 16  SpO2:  [99 %] 99 %  BP: (146)/(73) 146/73  Most recent vitals: BP (!) 146/73   Pulse 84   Temp 98.2 °F (36.8 °C)   Resp 16   SpO2 99%    GEN: well-developed, well-nourished, awake and alert, non-toxic appearing adult  HEENT: PERRL, sclera anicteric, oral mucosa pink and moist without lesion  NECK: trachea midline; Good ROM  CV: regular rate and rhythm, no murmurs or gallops  RESP: clear to auscultation bilaterally, no wheezes, rhonci or rales  ABD: soft, non-tender, non-distended, normal bowel sounds  EXT: no swelling or edema, 2+ pulses distally  SKIN: no rashes or jaundice  PSYCH: normal affect    Labs:   No results for input(s): WBC, MCV, PLT in the last 72 hours.    Invalid input(s): HGBAU  No results for input(s): NA, K, CL, CO2, BUN, GLU in the last 72 hours.    Invalid input(s): CREA  No results for input(s): ALB in the last 72 hours.    Invalid input(s): ALKP, SGOT, SGPT, TBIL, DBIL, TPRO  No results for input(s): PT, INR, PTT in the last 72  hours.      IMPRESSION / RECOMMENDATIONS:  Colon  with interventions as warranted.   RIsks, benefits, alternatives discussed in detail regarding upcoming procedures and sedation. Some of the more common endoscopic complications include but not limited to immediate or delayed perforation, bleeding, infections, pain, inadvertent injury to surrounding tissue / organs and possible need for surgical evaluation. Patient expressed understanding, all questions answered and will proceed with procedure as planned.     Jorge A Pereyra III  3/7/2023  9:22 AM

## 2023-03-07 NOTE — ANESTHESIA PREPROCEDURE EVALUATION
03/07/2023  Beka Bee is a 62 y.o., male.      Patient Active Problem List   Diagnosis    Chronic pain syndrome    Opiate dependence, continuous    Depression    Chronic neck and back pain    Opiate withdrawal    Constipation    Gastroesophageal reflux disease    Chronic neck pain    Nausea & vomiting    History of colon polyps       Past Surgical History:   Procedure Laterality Date    COLONOSCOPY N/A 09/17/2021    Procedure: COLONOSCOPY;  Surgeon: Jesse Ambrosio MD;  Location: [facility];  Service: Endoscopy;  Laterality: N/A;    ESOPHAGOGASTRODUODENOSCOPY N/A 07/16/2021    Procedure: EGD (ESOPHAGOGASTRODUODENOSCOPY);  Surgeon: Jesse Ambrosio MD;  Location: [facility];  Service: Endoscopy;  Laterality: N/A;    KNEE ARTHROSCOPY Right 1990        Tobacco Use:  The patient  reports that he has been smoking cigarettes. He has been smoking an average of .5 packs per day. He has never used smokeless tobacco.     Results for orders placed or performed during the hospital encounter of 03/03/23   EKG 12-lead    Collection Time: 03/03/23  9:26 AM    Narrative    Test Reason : Z01.818,    Vent. Rate : 084 BPM     Atrial Rate : 084 BPM     P-R Int : 134 ms          QRS Dur : 072 ms      QT Int : 348 ms       P-R-T Axes : 082 033 076 degrees     QTc Int : 411 ms    Sinus rhythm with frequent Premature ventricular complexes and Fusion  complexes  Right atrial enlargement  Borderline Abnormal ECG  No previous ECGs available  Confirmed by Fausto Betancourt MD ([id number]) on 3/5/2023 11:13:52 AM    Referred By:             Confirmed By:Fausto Betancourt MD             Lab Results   Component Value Date    WBC 8.72 03/03/2023    HGB 12.9 (L) 03/03/2023    HCT 38.5 (L) 03/03/2023     (H) 03/03/2023     03/03/2023     BMP  Lab Results   Component Value Date     03/03/2023    K 4.1 03/03/2023      03/03/2023    CO2 28 03/03/2023    BUN 10 03/03/2023    CREATININE 0.6 03/03/2023    CALCIUM 9.2 03/03/2023    ANIONGAP 7 (L) 03/03/2023     03/03/2023    GLU 87 06/14/2021       No results found for this or any previous visit.            Pre-op Assessment    I have reviewed the Patient Summary Reports.     I have reviewed the Nursing Notes. I have reviewed the NPO Status.   I have reviewed the Medications.     Review of Systems  Anesthesia Hx:  No problems with previous Anesthesia  Denies Family Hx of Anesthesia complications.   Denies Personal Hx of Anesthesia complications.   Social:  Smoker    Hematology/Oncology:     Oncology Normal    -- Anemia:   EENT/Dental:EENT/Dental Normal   Cardiovascular:  Cardiovascular Normal  ECG has been reviewed.    Pulmonary:  Pulmonary Normal    Renal/:  Renal/ Normal     Hepatic/GI:   PUD, GERD    Musculoskeletal:  Musculoskeletal Normal    Neurological:   Chronic Pain Syndrome   Endocrine:  Endocrine Normal    Psych:  Psychiatric Normal           Physical Exam  General: Well nourished, Cooperative, Alert and Oriented    Airway:  Mallampati: II   Mouth Opening: Normal  TM Distance: Normal  Tongue: Normal  Neck ROM: Normal ROM    Dental:  Intact    Chest/Lungs:  Clear to auscultation    Heart:  Rate: Normal  Rhythm: Regular Rhythm  Sounds: Normal        Anesthesia Plan  Type of Anesthesia, risks & benefits discussed:    Anesthesia Type: MAC  Intra-op Monitoring Plan: Standard ASA Monitors  Informed Consent: Informed consent signed with the Patient and all parties understand the risks and agree with anesthesia plan.  All questions answered.   ASA Score: 2    Ready For Surgery From Anesthesia Perspective.     .

## 2023-09-11 ENCOUNTER — TELEPHONE (OUTPATIENT)
Dept: FAMILY MEDICINE | Facility: CLINIC | Age: 63
End: 2023-09-11

## 2023-09-11 NOTE — TELEPHONE ENCOUNTER
Called patient to confirm his new patient appointment for 9/13/2023 at 2:20. No answer but left message regarding our no show policy and to bring all medication bottles to his appointment.

## 2023-09-13 ENCOUNTER — TELEPHONE (OUTPATIENT)
Dept: FAMILY MEDICINE | Facility: CLINIC | Age: 63
End: 2023-09-13

## 2023-09-13 ENCOUNTER — OFFICE VISIT (OUTPATIENT)
Dept: FAMILY MEDICINE | Facility: CLINIC | Age: 63
End: 2023-09-13
Payer: COMMERCIAL

## 2023-09-13 VITALS
HEART RATE: 90 BPM | WEIGHT: 125 LBS | BODY MASS INDEX: 16.04 KG/M2 | DIASTOLIC BLOOD PRESSURE: 86 MMHG | SYSTOLIC BLOOD PRESSURE: 134 MMHG | HEIGHT: 74 IN

## 2023-09-13 DIAGNOSIS — M54.2 CHRONIC NECK PAIN: ICD-10-CM

## 2023-09-13 DIAGNOSIS — Z79.899 LONG-TERM USE OF HIGH-RISK MEDICATION: ICD-10-CM

## 2023-09-13 DIAGNOSIS — G89.29 CHRONIC NECK PAIN: ICD-10-CM

## 2023-09-13 DIAGNOSIS — M25.561 CHRONIC PAIN OF RIGHT KNEE: Primary | ICD-10-CM

## 2023-09-13 DIAGNOSIS — G89.29 CHRONIC PAIN OF RIGHT KNEE: Primary | ICD-10-CM

## 2023-09-13 DIAGNOSIS — K21.9 GASTROESOPHAGEAL REFLUX DISEASE, UNSPECIFIED WHETHER ESOPHAGITIS PRESENT: ICD-10-CM

## 2023-09-13 DIAGNOSIS — M62.838 MUSCLE SPASM: ICD-10-CM

## 2023-09-13 PROCEDURE — 3079F DIAST BP 80-89 MM HG: CPT | Mod: CPTII,S$GLB,, | Performed by: FAMILY MEDICINE

## 2023-09-13 PROCEDURE — 99203 PR OFFICE/OUTPT VISIT, NEW, LEVL III, 30-44 MIN: ICD-10-PCS | Mod: S$GLB,,, | Performed by: FAMILY MEDICINE

## 2023-09-13 PROCEDURE — 3008F BODY MASS INDEX DOCD: CPT | Mod: CPTII,S$GLB,, | Performed by: FAMILY MEDICINE

## 2023-09-13 PROCEDURE — 1159F PR MEDICATION LIST DOCUMENTED IN MEDICAL RECORD: ICD-10-PCS | Mod: CPTII,S$GLB,, | Performed by: FAMILY MEDICINE

## 2023-09-13 PROCEDURE — 3008F PR BODY MASS INDEX (BMI) DOCUMENTED: ICD-10-PCS | Mod: CPTII,S$GLB,, | Performed by: FAMILY MEDICINE

## 2023-09-13 PROCEDURE — 1160F PR REVIEW ALL MEDS BY PRESCRIBER/CLIN PHARMACIST DOCUMENTED: ICD-10-PCS | Mod: CPTII,S$GLB,, | Performed by: FAMILY MEDICINE

## 2023-09-13 PROCEDURE — 3075F PR MOST RECENT SYSTOLIC BLOOD PRESS GE 130-139MM HG: ICD-10-PCS | Mod: CPTII,S$GLB,, | Performed by: FAMILY MEDICINE

## 2023-09-13 PROCEDURE — 3079F PR MOST RECENT DIASTOLIC BLOOD PRESSURE 80-89 MM HG: ICD-10-PCS | Mod: CPTII,S$GLB,, | Performed by: FAMILY MEDICINE

## 2023-09-13 PROCEDURE — 1159F MED LIST DOCD IN RCRD: CPT | Mod: CPTII,S$GLB,, | Performed by: FAMILY MEDICINE

## 2023-09-13 PROCEDURE — 99203 OFFICE O/P NEW LOW 30 MIN: CPT | Mod: S$GLB,,, | Performed by: FAMILY MEDICINE

## 2023-09-13 PROCEDURE — 3075F SYST BP GE 130 - 139MM HG: CPT | Mod: CPTII,S$GLB,, | Performed by: FAMILY MEDICINE

## 2023-09-13 PROCEDURE — 1160F RVW MEDS BY RX/DR IN RCRD: CPT | Mod: CPTII,S$GLB,, | Performed by: FAMILY MEDICINE

## 2023-09-13 RX ORDER — TIZANIDINE 4 MG/1
4 TABLET ORAL NIGHTLY
COMMUNITY
Start: 2023-08-28 | End: 2023-12-14

## 2023-09-13 RX ORDER — HYDROXYZINE HYDROCHLORIDE 50 MG/1
50 TABLET, FILM COATED ORAL 3 TIMES DAILY PRN
COMMUNITY
End: 2024-03-21

## 2023-09-13 RX ORDER — OXYCODONE AND ACETAMINOPHEN 10; 325 MG/1; MG/1
1 TABLET ORAL EVERY 8 HOURS PRN
Qty: 90 TABLET | Refills: 0 | Status: SHIPPED | OUTPATIENT
Start: 2023-09-13 | End: 2023-10-12 | Stop reason: SDUPTHER

## 2023-09-13 RX ORDER — METHOCARBAMOL 750 MG/1
500 TABLET, FILM COATED ORAL 4 TIMES DAILY
COMMUNITY
End: 2023-09-13

## 2023-09-13 NOTE — TELEPHONE ENCOUNTER
----- Message from Zoey Magallon sent at 9/13/2023  3:40 PM CDT -----  The patient saw Dr. Snow today. He needs to be seen in 3 months. He needs it as late as possible. Pt's # [phone]

## 2023-09-13 NOTE — PROGRESS NOTES
SUBJECTIVE:    Patient ID: Beka Bee is a 63 y.o. male.    Chief Complaint: Establish Care (New patient to establish care //brought med bottles//tc)    Pt here to get established.    PMHx of cervical DDD, GERD, ADD.    Works as a , but has not worked since January because of the pain in his neck. Was seeing Dr. Henderson that did ROMAN and rhizotomies that did not work. Was sent to see NS (Summers), and just had surgery 8/21/2023, total replacement of cervical intervertebral disk. (Currently on Disability at work) The back of the head is doing better, but is still having pain in the back of the neck. Still having to wear a hard brace at home. Has a soft brace on tonight.   Told he could loose 20-30% of his movement. Waiting to get a bone stimulator.  Has chronic back pain, but doesn't bother him nearly as much as the neck. Due to pain unable to do things he used. Cant go fishing like he used to.  Having trouble sleeping at night. Get headaches if he lays down for a long period of time.  Has been having trouble with swallowing since his surgery. Has lost about 15 pounds since his surgery. But he is starting to eat better and gain his weight.   Having a lot of spasms in the neck, not being relieved by robaxin, would not mind taking another one.    Has a lump on the chest that has been there for 3-4 months. Has not gotten any bigger and is not tender.    Has a lump on the bump on the back of his knee that gets bigger when he bends it a lot. It will get smaller.    Denies heartburn. Hx peptic ulcer in the past. So avoids NSAIDs. Taking protonix daily    Takes miralax when he takes pain meds, and not having any constipation    Started smoking at 43 yrs., 0.5 yrs. Has been vaping.     Has been on Adderall for 20 yrs.    ------------------------------------------------------------------------  Cscope 3/2023        No visits with results within 6 Month(s) from this visit.   Latest known visit with  results is:   Lab Visit on 03/03/2023   Component Date Value Ref Range Status    WBC 03/03/2023 8.72  3.90 - 12.70 K/uL Final    RBC 03/03/2023 3.87 (L)  4.60 - 6.20 M/uL Final    Hemoglobin 03/03/2023 12.9 (L)  14.0 - 18.0 g/dL Final    Hematocrit 03/03/2023 38.5 (L)  40.0 - 54.0 % Final    MCV 03/03/2023 100 (H)  82 - 98 fL Final    MCH 03/03/2023 33.3 (H)  27.0 - 31.0 pg Final    MCHC 03/03/2023 33.5  32.0 - 36.0 g/dL Final    RDW 03/03/2023 13.4  11.5 - 14.5 % Final    Platelets 03/03/2023 434  150 - 450 K/uL Final    MPV 03/03/2023 10.1  9.2 - 12.9 fL Final    Immature Granulocytes 03/03/2023 0.2  0.0 - 0.5 % Final    Gran # (ANC) 03/03/2023 4.6  1.8 - 7.7 K/uL Final    Immature Grans (Abs) 03/03/2023 0.02  0.00 - 0.04 K/uL Final    Lymph # 03/03/2023 3.1  1.0 - 4.8 K/uL Final    Mono # 03/03/2023 0.7  0.3 - 1.0 K/uL Final    Eos # 03/03/2023 0.3  0.0 - 0.5 K/uL Final    Baso # 03/03/2023 0.09  0.00 - 0.20 K/uL Final    nRBC 03/03/2023 0  0 /100 WBC Final    Gran % 03/03/2023 53.1  38.0 - 73.0 % Final    Lymph % 03/03/2023 35.1  18.0 - 48.0 % Final    Mono % 03/03/2023 7.5  4.0 - 15.0 % Final    Eosinophil % 03/03/2023 3.1  0.0 - 8.0 % Final    Basophil % 03/03/2023 1.0  0.0 - 1.9 % Final    Differential Method 03/03/2023 Automated   Final    Sodium 03/03/2023 138  136 - 145 mmol/L Final    Potassium 03/03/2023 4.1  3.5 - 5.1 mmol/L Final    Chloride 03/03/2023 103  95 - 110 mmol/L Final    CO2 03/03/2023 28  23 - 29 mmol/L Final    Glucose 03/03/2023 105  70 - 110 mg/dL Final    BUN 03/03/2023 10  8 - 23 mg/dL Final    Creatinine 03/03/2023 0.6  0.5 - 1.4 mg/dL Final    Calcium 03/03/2023 9.2  8.7 - 10.5 mg/dL Final    Anion Gap 03/03/2023 7 (L)  8 - 16 mmol/L Final    eGFR 03/03/2023 >60.0  >60 mL/min/1.73 m^2 Final       Past Medical History:   Diagnosis Date    ADHD (attention deficit hyperactivity disorder)     Depression     Digestive disorder     colon polyps    PUD (peptic ulcer disease) 1990    blood  transfusion     Social History     Socioeconomic History    Marital status: Single   Tobacco Use    Smoking status: Every Day     Current packs/day: 0.50     Types: Vaping with nicotine, Cigarettes    Smokeless tobacco: Never    Tobacco comments:     Advised not to smoke DOS   Substance and Sexual Activity    Alcohol use: Yes     Alcohol/week: 1.0 standard drink of alcohol     Types: 1 Cans of beer per week     Comment: rare    Drug use: Yes    Sexual activity: Yes     Partners: Female   Social History Narrative    ** Merged History Encounter **          Past Surgical History:   Procedure Laterality Date    COLONOSCOPY N/A 09/17/2021    Procedure: COLONOSCOPY;  Surgeon: Jesse Ambrosio MD;  Location: [facility];  Service: Endoscopy;  Laterality: N/A;    COLONOSCOPY N/A 03/07/2023    Procedure: COLONOSCOPY;  Surgeon: Jorge A Pereyra III, MD;  Location: [facility];  Service: Endoscopy;  Laterality: N/A;    COLONOSCOPY W/ POLYPECTOMY  03/07/2023    ESOPHAGOGASTRODUODENOSCOPY N/A 07/16/2021    Procedure: EGD (ESOPHAGOGASTRODUODENOSCOPY);  Surgeon: Jesse Ambrosio MD;  Location: [facility];  Service: Endoscopy;  Laterality: N/A;    KNEE ARTHROSCOPY Right 1990    TOTAL REPLACEMENT OF CERVICAL INTERVERTEBRAL DISC N/A 08/21/2023     Family History   Problem Relation Age of Onset    No Known Problems Mother     Cancer Father        Review of patient's allergies indicates:   Allergen Reactions    Aspirin      Gastric ulcers       Current Outpatient Medications:     dextroamphetamine-amphetamine (ADDERALL XR) 20 MG 24 hr capsule, Take 20 mg by mouth 2 (two) times a day. , Disp: , Rfl:     hydrOXYzine (ATARAX) 50 MG tablet, Take 50 mg by mouth 3 (three) times daily as needed for Itching. Take 1-2 tablets at bedtime, Disp: , Rfl:     omeprazole (PRILOSEC) 20 MG capsule, Take 20 mg by mouth 2 (two) times a day., Disp: , Rfl:     pantoprazole (PROTONIX) 40 MG tablet, Take 1 tablet (40 mg total) by mouth once daily., Disp: 90  "tablet, Rfl: 3    oxyCODONE-acetaminophen (PERCOCET)  mg per tablet, Take 1 tablet by mouth every 8 (eight) hours as needed for Pain., Disp: 90 tablet, Rfl: 0    tiZANidine (ZANAFLEX) 4 MG tablet, Take 4 mg by mouth every evening., Disp: , Rfl:     zolpidem (AMBIEN) 10 mg Tab, Take 10 mg by mouth., Disp: , Rfl:     Review of Systems   Constitutional:  Negative for appetite change, fatigue, fever and unexpected weight change.   Respiratory:  Negative for cough, chest tightness, shortness of breath and wheezing.    Cardiovascular:  Negative for chest pain and leg swelling.   Gastrointestinal:  Negative for abdominal pain, constipation, nausea and vomiting.        -heartburn   Genitourinary:  Negative for decreased urine volume, difficulty urinating, dysuria and frequency.   Musculoskeletal:  Positive for back pain and neck pain. Negative for arthralgias and myalgias.   Skin:  Negative for rash.   Neurological:  Negative for dizziness, weakness, numbness and headaches.   Hematological:  Does not bruise/bleed easily.   Psychiatric/Behavioral:  Negative for dysphoric mood, sleep disturbance and suicidal ideas. The patient is not nervous/anxious.    All other systems reviewed and are negative.         Objective:      Vitals:    09/13/23 1415   BP: 134/86   Pulse: 90   Weight: 56.7 kg (125 lb)   Height: 6' 2" (1.88 m)     Physical Exam  Vitals reviewed.   Constitutional:       General: He is not in acute distress.     Appearance: Normal appearance. He is well-developed.   HENT:      Head: Normocephalic and atraumatic.      Right Ear: Tympanic membrane and ear canal normal.      Left Ear: Tympanic membrane and ear canal normal.   Neck:      Thyroid: No thyromegaly.   Cardiovascular:      Rate and Rhythm: Normal rate and regular rhythm.      Heart sounds: Normal heart sounds. No murmur heard.     No friction rub.   Pulmonary:      Effort: Pulmonary effort is normal.      Breath sounds: Normal breath sounds. No wheezing " or rales.   Abdominal:      General: Bowel sounds are normal. There is no distension.      Palpations: Abdomen is soft.      Tenderness: There is no abdominal tenderness.   Musculoskeletal:      Cervical back: Neck supple.      Right knee: Swelling (back of knee) and crepitus present. Normal range of motion.   Lymphadenopathy:      Cervical: No cervical adenopathy.   Skin:     General: Skin is warm and dry.      Findings: No rash.      Comments: Left neck scar healing, C/D/I   Neurological:      Mental Status: He is alert and oriented to person, place, and time.   Psychiatric:         Attention and Perception: He is attentive.         Speech: Speech normal.         Behavior: Behavior normal.         Thought Content: Thought content normal.         Judgment: Judgment normal.           Assessment:       1. Chronic pain of right knee    2. Chronic neck pain    3. Gastroesophageal reflux disease, unspecified whether esophagitis present    4. Muscle spasm    5. Long-term use of high-risk medication         Plan:       Chronic pain of right knee  Comments:  Suspect arthritis and baker's cyst. Will get Xray  Orders:  -     X-Ray Knee Complete 4 Or More Views Right; Future; Expected date: 09/13/2023    Chronic neck pain  Comments:  Improving. Pain interfering with sleep and quality of life, function, and work.  Orders:  -     oxyCODONE-acetaminophen (PERCOCET)  mg per tablet; Take 1 tablet by mouth every 8 (eight) hours as needed for Pain.  Dispense: 90 tablet; Refill: 0    Gastroesophageal reflux disease, unspecified whether esophagitis present  Comments:  Controlled. Will continue to monitor symptoms on ppi.    Muscle spasm  Comments:  Will try on another muscle relaxer    Long-term use of high-risk medication  -     TSH w/reflex to FT4; Future; Expected date: 09/13/2023  -     Urinalysis, Reflex to Urine Culture Urine, Clean Catch; Future; Expected date: 09/13/2023  -     CBC Auto Differential; Future; Expected  date: 09/13/2023  -     Lipid Panel; Future; Expected date: 09/13/2023  -     Comprehensive Metabolic Panel; Future; Expected date: 09/13/2023    Labs and/or tests have been ordered for the evaluation/monitoring of acute/chronic conditions, to be done just before next visit.    Follow up in about 3 months (around 12/13/2023).        9/13/2023 Sherri Snow

## 2023-09-13 NOTE — TELEPHONE ENCOUNTER
----- Message from Jacqueline Perla sent at 9/13/2023  3:51 PM CDT -----  Patient called and stated that he missed a call from the nurse, please give him a call back at [phone]

## 2023-10-12 DIAGNOSIS — G89.29 CHRONIC NECK PAIN: ICD-10-CM

## 2023-10-12 DIAGNOSIS — M54.2 CHRONIC NECK PAIN: ICD-10-CM

## 2023-10-12 RX ORDER — OXYCODONE AND ACETAMINOPHEN 10; 325 MG/1; MG/1
1 TABLET ORAL EVERY 8 HOURS PRN
Qty: 90 TABLET | Refills: 0 | Status: SHIPPED | OUTPATIENT
Start: 2023-10-13 | End: 2023-11-13 | Stop reason: SDUPTHER

## 2023-10-12 NOTE — TELEPHONE ENCOUNTER
----- Message from Zoey Magallon sent at 10/12/2023  9:46 AM CDT -----  Refill Percocet 10 mg [facility]. Pt's # [phone]

## 2023-10-12 NOTE — TELEPHONE ENCOUNTER
The patient's prescription has been approved and sent to   [facility] - [place], [place] - [address]  [place] [id number]  Phone: [phone] Fax: [phone]

## 2023-11-13 DIAGNOSIS — M54.2 CHRONIC NECK PAIN: ICD-10-CM

## 2023-11-13 DIAGNOSIS — G89.29 CHRONIC NECK PAIN: ICD-10-CM

## 2023-11-13 RX ORDER — OXYCODONE AND ACETAMINOPHEN 10; 325 MG/1; MG/1
1 TABLET ORAL EVERY 8 HOURS PRN
Qty: 90 TABLET | Refills: 0 | Status: SHIPPED | OUTPATIENT
Start: 2023-11-13 | End: 2023-12-11 | Stop reason: SDUPTHER

## 2023-11-13 NOTE — TELEPHONE ENCOUNTER
----- Message from Ninfa Davies sent at 11/13/2023  9:13 AM CST -----  Pt needs refill on pain medication   [facility]   [phone]

## 2023-11-14 NOTE — TELEPHONE ENCOUNTER
The patient's prescription has been approved and sent to   [facility] - [place], [place] - [address]  [place] [id number]  Phone: [phone] Fax: [phone]

## 2023-11-30 ENCOUNTER — TELEPHONE (OUTPATIENT)
Dept: FAMILY MEDICINE | Facility: CLINIC | Age: 63
End: 2023-11-30

## 2023-12-14 ENCOUNTER — TELEPHONE (OUTPATIENT)
Dept: FAMILY MEDICINE | Facility: CLINIC | Age: 63
End: 2023-12-14

## 2023-12-14 ENCOUNTER — OFFICE VISIT (OUTPATIENT)
Dept: FAMILY MEDICINE | Facility: CLINIC | Age: 63
End: 2023-12-14
Payer: COMMERCIAL

## 2023-12-14 VITALS
DIASTOLIC BLOOD PRESSURE: 76 MMHG | SYSTOLIC BLOOD PRESSURE: 156 MMHG | WEIGHT: 132 LBS | HEART RATE: 83 BPM | BODY MASS INDEX: 16.94 KG/M2 | HEIGHT: 74 IN

## 2023-12-14 DIAGNOSIS — Z51.81 ENCOUNTER FOR THERAPEUTIC DRUG MONITORING: ICD-10-CM

## 2023-12-14 DIAGNOSIS — M54.2 CHRONIC NECK PAIN: Primary | ICD-10-CM

## 2023-12-14 DIAGNOSIS — G89.29 CHRONIC NECK PAIN: Primary | ICD-10-CM

## 2023-12-14 DIAGNOSIS — Z23 INFLUENZA VACCINE ADMINISTERED: ICD-10-CM

## 2023-12-14 DIAGNOSIS — R03.0 ELEVATED BLOOD PRESSURE READING WITHOUT DIAGNOSIS OF HYPERTENSION: ICD-10-CM

## 2023-12-14 DIAGNOSIS — M62.838 MUSCLE SPASM: ICD-10-CM

## 2023-12-14 DIAGNOSIS — Z79.899 ENCOUNTER FOR LONG-TERM (CURRENT) USE OF OTHER MEDICATIONS: ICD-10-CM

## 2023-12-14 PROCEDURE — 3008F BODY MASS INDEX DOCD: CPT | Mod: CPTII,S$GLB,, | Performed by: FAMILY MEDICINE

## 2023-12-14 PROCEDURE — 3077F SYST BP >= 140 MM HG: CPT | Mod: CPTII,S$GLB,, | Performed by: FAMILY MEDICINE

## 2023-12-14 PROCEDURE — 90686 FLU VACCINE (QUAD) GREATER THAN OR EQUAL TO 3YO PRESERVATIVE FREE IM: ICD-10-PCS | Mod: S$GLB,,, | Performed by: FAMILY MEDICINE

## 2023-12-14 PROCEDURE — 3078F PR MOST RECENT DIASTOLIC BLOOD PRESSURE < 80 MM HG: ICD-10-PCS | Mod: CPTII,S$GLB,, | Performed by: FAMILY MEDICINE

## 2023-12-14 PROCEDURE — 99214 OFFICE O/P EST MOD 30 MIN: CPT | Mod: 25,S$GLB,, | Performed by: FAMILY MEDICINE

## 2023-12-14 PROCEDURE — 3008F PR BODY MASS INDEX (BMI) DOCUMENTED: ICD-10-PCS | Mod: CPTII,S$GLB,, | Performed by: FAMILY MEDICINE

## 2023-12-14 PROCEDURE — 90471 FLU VACCINE (QUAD) GREATER THAN OR EQUAL TO 3YO PRESERVATIVE FREE IM: ICD-10-PCS | Mod: S$GLB,,, | Performed by: FAMILY MEDICINE

## 2023-12-14 PROCEDURE — 90471 IMMUNIZATION ADMIN: CPT | Mod: S$GLB,,, | Performed by: FAMILY MEDICINE

## 2023-12-14 PROCEDURE — 99214 PR OFFICE/OUTPT VISIT, EST, LEVL IV, 30-39 MIN: ICD-10-PCS | Mod: 25,S$GLB,, | Performed by: FAMILY MEDICINE

## 2023-12-14 PROCEDURE — 90686 IIV4 VACC NO PRSV 0.5 ML IM: CPT | Mod: S$GLB,,, | Performed by: FAMILY MEDICINE

## 2023-12-14 PROCEDURE — 3077F PR MOST RECENT SYSTOLIC BLOOD PRESSURE >= 140 MM HG: ICD-10-PCS | Mod: CPTII,S$GLB,, | Performed by: FAMILY MEDICINE

## 2023-12-14 PROCEDURE — 1159F PR MEDICATION LIST DOCUMENTED IN MEDICAL RECORD: ICD-10-PCS | Mod: CPTII,S$GLB,, | Performed by: FAMILY MEDICINE

## 2023-12-14 PROCEDURE — 1160F RVW MEDS BY RX/DR IN RCRD: CPT | Mod: CPTII,S$GLB,, | Performed by: FAMILY MEDICINE

## 2023-12-14 PROCEDURE — 1160F PR REVIEW ALL MEDS BY PRESCRIBER/CLIN PHARMACIST DOCUMENTED: ICD-10-PCS | Mod: CPTII,S$GLB,, | Performed by: FAMILY MEDICINE

## 2023-12-14 PROCEDURE — 3078F DIAST BP <80 MM HG: CPT | Mod: CPTII,S$GLB,, | Performed by: FAMILY MEDICINE

## 2023-12-14 PROCEDURE — 1159F MED LIST DOCD IN RCRD: CPT | Mod: CPTII,S$GLB,, | Performed by: FAMILY MEDICINE

## 2023-12-14 RX ORDER — OXYCODONE AND ACETAMINOPHEN 10; 325 MG/1; MG/1
1 TABLET ORAL EVERY 8 HOURS PRN
Qty: 90 TABLET | Refills: 0 | Status: SHIPPED | OUTPATIENT
Start: 2023-12-14 | End: 2024-01-09 | Stop reason: SDUPTHER

## 2023-12-14 RX ORDER — METHOCARBAMOL 750 MG/1
500 TABLET, FILM COATED ORAL 4 TIMES DAILY
COMMUNITY
End: 2023-12-14

## 2023-12-14 RX ORDER — LISINOPRIL 10 MG/1
10 TABLET ORAL DAILY PRN
Qty: 30 TABLET | Refills: 3 | Status: SHIPPED | OUTPATIENT
Start: 2023-12-14 | End: 2024-02-11 | Stop reason: SDUPTHER

## 2023-12-14 RX ORDER — CARISOPRODOL 350 MG/1
350 TABLET ORAL 3 TIMES DAILY PRN
Qty: 90 TABLET | Refills: 0 | Status: SHIPPED | OUTPATIENT
Start: 2023-12-14 | End: 2024-01-09 | Stop reason: SDUPTHER

## 2023-12-14 RX ORDER — QUETIAPINE FUMARATE 100 MG/1
100 TABLET, FILM COATED ORAL DAILY
COMMUNITY
End: 2024-03-21

## 2023-12-14 NOTE — PROGRESS NOTES
SUBJECTIVE:    Patient ID: Beka Bee is a 63 y.o. male.    Chief Complaint: Follow-up (3 mo f/u//no med bottles//last dose oxycodone this afternoon//last dose adderall this am//fluarix ordered//tc)    Pt here to checkup on acute and chronic conditions.    PMHx of cervical DDD, GERD, ADD.    States the pain in his neck is more like a muscle pain. The pain in bad when he lays down. Tries to wear his brace but it is only comfortable on his side.  He does wear it during the day. Every day he gets up he is hurting somewhere else. He is trying not to whine about it, but every day is a new pain. Has been going to PT 3 days a week.     Worked as a , but has not worked since January. Has been taking his pain med every 4 hours. Not against taking something for muscle pain.  NS ([place]) did surgery 8/21/2023, total replacement of cervical intervertebral disk. (Currently on Disability at work). Told he could loose 20-30% of his movement. Bone stimulator was not approved. Next appt is next month.   Has chronic back pain. Has left sciatic back pain. Has had shots in his buttocks that have helped the pain. He is not adverse to having another one in the future.   Having trouble sleeping at night due to the pain and still having headaches at night. Has taken robaxin and flexeril that did not help. Soma and valium has helped.   Swallowing still bothers him a little but not as much as it was.  Weight is stable.        Denies heartburn. Hx peptic ulcer in the past. So avoids NSAIDs. Taking protonix daily    Takes miralax when he takes pain meds, and not having any constipation    Not smoking at 43 yrs., 0.5 yrs. Has been vaping.     Has been on Adderall for 20 yrs.    No recent labs. Pt has not had labs.   ------------------------------------------------------------------------  Cscope 3/2023        No visits with results within 6 Month(s) from this visit.   Latest known visit with results is:   Lab  Visit on 03/03/2023   Component Date Value Ref Range Status    WBC 03/03/2023 8.72  3.90 - 12.70 K/uL Final    RBC 03/03/2023 3.87 (L)  4.60 - 6.20 M/uL Final    Hemoglobin 03/03/2023 12.9 (L)  14.0 - 18.0 g/dL Final    Hematocrit 03/03/2023 38.5 (L)  40.0 - 54.0 % Final    MCV 03/03/2023 100 (H)  82 - 98 fL Final    MCH 03/03/2023 33.3 (H)  27.0 - 31.0 pg Final    MCHC 03/03/2023 33.5  32.0 - 36.0 g/dL Final    RDW 03/03/2023 13.4  11.5 - 14.5 % Final    Platelets 03/03/2023 434  150 - 450 K/uL Final    MPV 03/03/2023 10.1  9.2 - 12.9 fL Final    Immature Granulocytes 03/03/2023 0.2  0.0 - 0.5 % Final    Gran # (ANC) 03/03/2023 4.6  1.8 - 7.7 K/uL Final    Immature Grans (Abs) 03/03/2023 0.02  0.00 - 0.04 K/uL Final    Lymph # 03/03/2023 3.1  1.0 - 4.8 K/uL Final    Mono # 03/03/2023 0.7  0.3 - 1.0 K/uL Final    Eos # 03/03/2023 0.3  0.0 - 0.5 K/uL Final    Baso # 03/03/2023 0.09  0.00 - 0.20 K/uL Final    nRBC 03/03/2023 0  0 /100 WBC Final    Gran % 03/03/2023 53.1  38.0 - 73.0 % Final    Lymph % 03/03/2023 35.1  18.0 - 48.0 % Final    Mono % 03/03/2023 7.5  4.0 - 15.0 % Final    Eosinophil % 03/03/2023 3.1  0.0 - 8.0 % Final    Basophil % 03/03/2023 1.0  0.0 - 1.9 % Final    Differential Method 03/03/2023 Automated   Final    Sodium 03/03/2023 138  136 - 145 mmol/L Final    Potassium 03/03/2023 4.1  3.5 - 5.1 mmol/L Final    Chloride 03/03/2023 103  95 - 110 mmol/L Final    CO2 03/03/2023 28  23 - 29 mmol/L Final    Glucose 03/03/2023 105  70 - 110 mg/dL Final    BUN 03/03/2023 10  8 - 23 mg/dL Final    Creatinine 03/03/2023 0.6  0.5 - 1.4 mg/dL Final    Calcium 03/03/2023 9.2  8.7 - 10.5 mg/dL Final    Anion Gap 03/03/2023 7 (L)  8 - 16 mmol/L Final    eGFR 03/03/2023 >60.0  >60 mL/min/1.73 m^2 Final       Past Medical History:   Diagnosis Date    ADHD (attention deficit hyperactivity disorder)     Depression     Digestive disorder     colon polyps    PUD (peptic ulcer disease) 1990    blood transfusion      Social History     Socioeconomic History    Marital status: Single   Tobacco Use    Smoking status: Every Day     Current packs/day: 0.50     Types: Vaping with nicotine, Cigarettes    Smokeless tobacco: Never    Tobacco comments:     Advised not to smoke DOS   Substance and Sexual Activity    Alcohol use: Yes     Alcohol/week: 1.0 standard drink of alcohol     Types: 1 Cans of beer per week     Comment: rare    Drug use: Yes    Sexual activity: Yes     Partners: Female   Social History Narrative    ** Merged History Encounter **          Past Surgical History:   Procedure Laterality Date    COLONOSCOPY N/A 09/17/2021    Procedure: COLONOSCOPY;  Surgeon: Jesse Ambrosio MD;  Location: [facility] ENDO;  Service: Endoscopy;  Laterality: N/A;    COLONOSCOPY N/A 03/07/2023    Procedure: COLONOSCOPY;  Surgeon: Jorge A Pereyra III, MD;  Location: [facility] ENDO;  Service: Endoscopy;  Laterality: N/A;    COLONOSCOPY W/ POLYPECTOMY  03/07/2023    ESOPHAGOGASTRODUODENOSCOPY N/A 07/16/2021    Procedure: EGD (ESOPHAGOGASTRODUODENOSCOPY);  Surgeon: Jesse Ambrosio MD;  Location: [facility];  Service: Endoscopy;  Laterality: N/A;    KNEE ARTHROSCOPY Right 1990    TOTAL REPLACEMENT OF CERVICAL INTERVERTEBRAL DISC N/A 08/21/2023     Family History   Problem Relation Age of Onset    No Known Problems Mother     Cancer Father        Review of patient's allergies indicates:   Allergen Reactions    Aspirin      Gastric ulcers       Current Outpatient Medications:     dextroamphetamine-amphetamine (ADDERALL XR) 20 MG 24 hr capsule, Take 20 mg by mouth 2 (two) times a day. , Disp: , Rfl:     hydrOXYzine (ATARAX) 50 MG tablet, Take 50 mg by mouth 3 (three) times daily as needed for Itching. Take 1-2 tablets at bedtime, Disp: , Rfl:     pantoprazole (PROTONIX) 40 MG tablet, Take 1 tablet (40 mg total) by mouth once daily., Disp: 90 tablet, Rfl: 3    QUEtiapine (SEROQUEL) 100 MG Tab, Take 100 mg by mouth once daily., Disp: , Rfl:      "carisoprodoL (SOMA) 350 MG tablet, Take 1 tablet (350 mg total) by mouth 3 (three) times daily as needed for Muscle spasms., Disp: 90 tablet, Rfl: 0    lisinopriL 10 MG tablet, Take 1 tablet (10 mg total) by mouth daily as needed (SBP>145/95)., Disp: 30 tablet, Rfl: 3    omeprazole (PRILOSEC) 20 MG capsule, Take 20 mg by mouth 2 (two) times a day., Disp: , Rfl:     oxyCODONE-acetaminophen (PERCOCET)  mg per tablet, Take 1 tablet by mouth every 8 (eight) hours as needed for Pain., Disp: 90 tablet, Rfl: 0    zolpidem (AMBIEN) 10 mg Tab, Take 10 mg by mouth., Disp: , Rfl:     Review of Systems   Constitutional:  Negative for appetite change, fatigue, fever and unexpected weight change.   Respiratory:  Negative for cough, chest tightness, shortness of breath and wheezing.    Cardiovascular:  Negative for chest pain and leg swelling.   Gastrointestinal:  Negative for abdominal pain, constipation, nausea and vomiting.        -heartburn   Genitourinary:  Negative for decreased urine volume, difficulty urinating, dysuria and frequency.   Musculoskeletal:  Positive for back pain and neck pain. Negative for arthralgias and myalgias.   Skin:  Negative for rash.   Neurological:  Negative for dizziness, weakness, numbness and headaches.   Hematological:  Does not bruise/bleed easily.   Psychiatric/Behavioral:  Negative for decreased concentration, dysphoric mood, sleep disturbance and suicidal ideas. The patient is not nervous/anxious.    All other systems reviewed and are negative.         Objective:      Vitals:    12/14/23 1531 12/14/23 1547   BP: (!) 166/102 (!) 156/76   Pulse: 83    Weight: 59.9 kg (132 lb)    Height: 6' 2" (1.88 m)      Wt Readings from Last 3 Encounters:   12/14/23 59.9 kg (132 lb)   09/13/23 56.7 kg (125 lb)   03/03/23 61 kg (134 lb 7.7 oz)         Physical Exam  Vitals reviewed.   Constitutional:       General: He is not in acute distress.     Appearance: Normal appearance. He is well-developed. "   HENT:      Head: Normocephalic and atraumatic.      Right Ear: Tympanic membrane and ear canal normal.      Left Ear: Tympanic membrane and ear canal normal.   Neck:      Thyroid: No thyromegaly.   Cardiovascular:      Rate and Rhythm: Normal rate and regular rhythm.      Heart sounds: Normal heart sounds. No murmur heard.     No friction rub.   Pulmonary:      Effort: Pulmonary effort is normal.      Breath sounds: Normal breath sounds. No wheezing or rales.   Abdominal:      General: Bowel sounds are normal. There is no distension.      Palpations: Abdomen is soft.      Tenderness: There is no abdominal tenderness.   Musculoskeletal:      Cervical back: Neck supple.      Right knee: Swelling (back of knee) and crepitus present. Normal range of motion.   Lymphadenopathy:      Cervical: No cervical adenopathy.   Skin:     General: Skin is warm and dry.      Findings: No rash.   Neurological:      Mental Status: He is alert and oriented to person, place, and time.   Psychiatric:         Attention and Perception: He is attentive.         Speech: Speech normal.         Behavior: Behavior normal.         Thought Content: Thought content normal.         Judgment: Judgment normal.           Assessment:       1. Chronic neck pain    2. Muscle spasm    3. Elevated blood pressure reading without diagnosis of hypertension    4. Influenza vaccine administered    5. Encounter for therapeutic drug monitoring    6. Encounter for long-term (current) use of other medications           Plan:       Chronic neck pain  Comments:  Suboptimal. Will continue to monitor symptoms on current medication regimen.  Pain interfering with sleep and quality of life, function, and work.  Orders:  -     oxyCODONE-acetaminophen (PERCOCET)  mg per tablet; Take 1 tablet by mouth every 8 (eight) hours as needed for Pain.  Dispense: 90 tablet; Refill: 0    Muscle spasm  Comments:  Chronic. Will try on soma, to avoid valium  Orders:  -      carisoprodoL (SOMA) 350 MG tablet; Take 1 tablet (350 mg total) by mouth 3 (three) times daily as needed for Muscle spasms.  Dispense: 90 tablet; Refill: 0    Elevated blood pressure reading without diagnosis of hypertension  Comments:  Suspect elevation. Will put on prn lisinopril.  Orders:  -     lisinopriL 10 MG tablet; Take 1 tablet (10 mg total) by mouth daily as needed (SBP>145/95).  Dispense: 30 tablet; Refill: 3    Influenza vaccine administered  -     Influenza - Quadrivalent *Preferred* (6 months+) (PF)    Encounter for therapeutic drug monitoring  -     DRUG MONITOR, PANEL 4, W/CONF, URINE; Future; Expected date: 12/14/2023    Encounter for long-term (current) use of other medications  -     DRUG MONITOR, PANEL 4, W/CONF, URINE; Future; Expected date: 12/14/2023      Labs and/or tests have been ordered for the evaluation/monitoring of acute/chronic conditions, to be done just before next visit.    Follow up in about 3 months (around 3/14/2024) for HTN.        12/14/2023 Sherri Snow

## 2023-12-14 NOTE — TELEPHONE ENCOUNTER
----- Message from Mckayla Morris sent at 12/14/2023  4:30 PM CST -----  Pt needs to schedule a 3month f/u, but late appointment.   [phone]

## 2023-12-20 ENCOUNTER — TELEPHONE (OUTPATIENT)
Dept: FAMILY MEDICINE | Facility: CLINIC | Age: 63
End: 2023-12-20
Payer: COMMERCIAL

## 2023-12-21 LAB
AMPHET UR-MCNC: 7216 NG/ML
AMPHETAMINES UR QL: POSITIVE NG/ML
BARBITURATES UR QL: NEGATIVE NG/ML
BENZODIAZ UR QL: NEGATIVE NG/ML
BZE UR QL: NEGATIVE NG/ML
CODEINE UR-MCNC: NEGATIVE NG/ML
CREAT UR-MCNC: 69.1 MG/DL
DRUG SCREEN COMMENT UR-IMP: ABNORMAL
HYDROCODONE UR-MCNC: NEGATIVE NG/ML
HYDROMORPHONE UR-MCNC: NEGATIVE NG/ML
METHADONE UR QL: NEGATIVE NG/ML
METHAMPHET UR-MCNC: NEGATIVE NG/ML
MORPHINE UR-MCNC: NEGATIVE NG/ML
NORHYDROCODONE UR CFM-MCNC: NEGATIVE NG/ML
NOROXYCODONE UR CFM-MCNC: ABNORMAL NG/ML
NOTES AND COMMENTS: ABNORMAL
OPIATES UR QL: ABNORMAL NG/ML
OXIDANTS UR QL: NEGATIVE MCG/ML
OXYCODONE UR QL: POSITIVE NG/ML
OXYCODONE UR-MCNC: 4896 NG/ML
OXYMORPHONE UR-MCNC: 2628 NG/ML
PCP UR QL: NEGATIVE NG/ML
PH UR: 6 [PH] (ref 4.5–9)

## 2024-01-09 DIAGNOSIS — R03.0 ELEVATED BLOOD PRESSURE READING WITHOUT DIAGNOSIS OF HYPERTENSION: ICD-10-CM

## 2024-01-09 RX ORDER — LISINOPRIL 10 MG/1
10 TABLET ORAL DAILY PRN
Qty: 30 TABLET | Refills: 3 | Status: CANCELLED | OUTPATIENT
Start: 2024-01-09

## 2024-01-16 ENCOUNTER — TELEPHONE (OUTPATIENT)
Dept: FAMILY MEDICINE | Facility: CLINIC | Age: 64
End: 2024-01-16
Payer: COMMERCIAL

## 2024-01-16 NOTE — TELEPHONE ENCOUNTER
----- Message from Laila Mtz sent at 1/16/2024  3:52 PM CST -----  PA for Carisoprodol has been approved for 1/11/24 - 1/11/25. LMOR. Thank you

## 2024-01-19 ENCOUNTER — TELEPHONE (OUTPATIENT)
Dept: FAMILY MEDICINE | Facility: CLINIC | Age: 64
End: 2024-01-19
Payer: COMMERCIAL

## 2024-01-19 NOTE — TELEPHONE ENCOUNTER
----- Message from Marylin Lomas MA sent at 12/18/2023  1:55 PM CST -----    ----- Message -----  From: Saumya Mcnair MA  Sent: 12/18/2023   1:07 PM CST  To: Sherri Pruitt

## 2024-03-12 DIAGNOSIS — M62.838 MUSCLE SPASM: ICD-10-CM

## 2024-03-12 RX ORDER — CARISOPRODOL 350 MG/1
350 TABLET ORAL 3 TIMES DAILY PRN
Qty: 90 TABLET | Refills: 0 | Status: CANCELLED | OUTPATIENT
Start: 2024-03-12

## 2024-03-12 NOTE — TELEPHONE ENCOUNTER
----- Message from Laila Mtz sent at 3/12/2024  2:44 PM CDT -----  Pt has some questions for the nurse. Pt #[phone]

## 2024-03-12 NOTE — TELEPHONE ENCOUNTER
Please see the attached refill request.  Next Appt this Specialty: With Family Medicine (Sherri Snow MD)  03/21/2024 at 3:40 PM

## 2024-03-13 DIAGNOSIS — M62.838 MUSCLE SPASM: ICD-10-CM

## 2024-03-13 RX ORDER — CARISOPRODOL 350 MG/1
350 TABLET ORAL 3 TIMES DAILY PRN
Qty: 90 TABLET | Refills: 0 | Status: SHIPPED | OUTPATIENT
Start: 2024-03-13 | End: 2024-04-11 | Stop reason: SDUPTHER

## 2024-03-13 NOTE — TELEPHONE ENCOUNTER
Spoke with pt. States meraz is out of [facility]. He would like it sent to [facility].

## 2024-03-13 NOTE — TELEPHONE ENCOUNTER
----- Message from Ninfa Davies sent at 3/13/2024  9:08 AM CDT -----  Pt would like to talk to nurse about some personal stuff   [phone]

## 2024-03-13 NOTE — TELEPHONE ENCOUNTER
prescription sent to   [facility] [address]  [facility] [address]  Phone: [phone] Fax: [phone]

## 2024-03-21 ENCOUNTER — TELEPHONE (OUTPATIENT)
Dept: FAMILY MEDICINE | Facility: CLINIC | Age: 64
End: 2024-03-21

## 2024-03-21 ENCOUNTER — OFFICE VISIT (OUTPATIENT)
Dept: FAMILY MEDICINE | Facility: CLINIC | Age: 64
End: 2024-03-21
Payer: COMMERCIAL

## 2024-03-21 VITALS
BODY MASS INDEX: 16.43 KG/M2 | SYSTOLIC BLOOD PRESSURE: 138 MMHG | OXYGEN SATURATION: 99 % | HEIGHT: 74 IN | HEART RATE: 100 BPM | DIASTOLIC BLOOD PRESSURE: 78 MMHG | WEIGHT: 128 LBS

## 2024-03-21 DIAGNOSIS — I10 PRIMARY HYPERTENSION: ICD-10-CM

## 2024-03-21 DIAGNOSIS — Z51.81 ENCOUNTER FOR THERAPEUTIC DRUG MONITORING: ICD-10-CM

## 2024-03-21 DIAGNOSIS — M54.2 CHRONIC NECK PAIN: Primary | ICD-10-CM

## 2024-03-21 DIAGNOSIS — M62.838 MUSCLE SPASM: ICD-10-CM

## 2024-03-21 DIAGNOSIS — G89.29 CHRONIC NECK PAIN: Primary | ICD-10-CM

## 2024-03-21 DIAGNOSIS — Z76.0 MEDICATION REFILL: ICD-10-CM

## 2024-03-21 DIAGNOSIS — Z79.899 ENCOUNTER FOR LONG-TERM (CURRENT) USE OF OTHER MEDICATIONS: ICD-10-CM

## 2024-03-21 PROCEDURE — 3008F BODY MASS INDEX DOCD: CPT | Mod: CPTII,S$GLB,, | Performed by: FAMILY MEDICINE

## 2024-03-21 PROCEDURE — 3075F SYST BP GE 130 - 139MM HG: CPT | Mod: CPTII,S$GLB,, | Performed by: FAMILY MEDICINE

## 2024-03-21 PROCEDURE — 4010F ACE/ARB THERAPY RXD/TAKEN: CPT | Mod: CPTII,S$GLB,, | Performed by: FAMILY MEDICINE

## 2024-03-21 PROCEDURE — 1160F RVW MEDS BY RX/DR IN RCRD: CPT | Mod: CPTII,S$GLB,, | Performed by: FAMILY MEDICINE

## 2024-03-21 PROCEDURE — 3078F DIAST BP <80 MM HG: CPT | Mod: CPTII,S$GLB,, | Performed by: FAMILY MEDICINE

## 2024-03-21 PROCEDURE — 99214 OFFICE O/P EST MOD 30 MIN: CPT | Mod: S$GLB,,, | Performed by: FAMILY MEDICINE

## 2024-03-21 PROCEDURE — 1159F MED LIST DOCD IN RCRD: CPT | Mod: CPTII,S$GLB,, | Performed by: FAMILY MEDICINE

## 2024-03-21 RX ORDER — PANTOPRAZOLE SODIUM 40 MG/1
40 TABLET, DELAYED RELEASE ORAL DAILY
Qty: 90 TABLET | Refills: 3 | Status: SHIPPED | OUTPATIENT
Start: 2024-03-21 | End: 2025-03-21

## 2024-03-21 RX ORDER — OXYCODONE AND ACETAMINOPHEN 10; 325 MG/1; MG/1
1 TABLET ORAL EVERY 6 HOURS PRN
Qty: 120 TABLET | Refills: 0 | Status: SHIPPED | OUTPATIENT
Start: 2024-03-21 | End: 2024-04-29 | Stop reason: SDUPTHER

## 2024-03-21 NOTE — TELEPHONE ENCOUNTER
----- Message from Mckayla Morris sent at 3/21/2024  4:15 PM CDT -----  Pt needs to schedule 3 month f/u.    [phone]

## 2024-03-21 NOTE — PROGRESS NOTES
SUBJECTIVE:    Patient ID: Beka Bee is a 63 y.o. male.    Chief Complaint: Follow-up (No bottles//Pt is here for a check up//NICKIE )    Pt here to checkup on acute and chronic conditions.    PMHx of cervical DDD, GERD, ADD.    States the pain in his neck is doing ok.  Taking his pain medication more than every 8 hours . The muscle relaxers did help the pain.    Wearing a posture brace in the daytime and wears his neck brace at night.  Has been going to PT 3 days a week.     Worked as a , but has not worked since January.  Has been taking his pain med every 8 hours but it doesn't last.   NS ([place]) did surgery 8/21/2023, total replacement of cervical intervertebral disk. (Currently on Disability at work). Told he could loose 20-30% of his movement. Bone stimulator was not approved.  Has ordered CT neck.    Has chronic back pain. Has left sciatic back pain.  (Failed robaxin and flexeril)  Soma and valium has helped.   Weight is stable.        Sometimes when he turns his head a certain way, he will get nauseated. Taking protonix helps. Has also taken zofran for it as well.     Has had labs done.  ------------------------------------------------------------------------  Cscope 3/2023        Office Visit on 12/14/2023   Component Date Value Ref Range Status    Amphetamines 12/18/2023 POSITIVE (A)  <500 ng/mL Final    Amphetamine 12/18/2023 7,216 (H)  <250 ng/mL Final    Methamphetamine 12/18/2023 NEGATIVE  <250 ng/mL Final    Amphetamines Comments 12/18/2023    Final    Barbiturates 12/18/2023 NEGATIVE  <300 ng/mL Final    Benzodiazepines 12/18/2023 NEGATIVE  <100 ng/mL Final    Cocaine Metabolites 12/18/2023 NEGATIVE  <150 ng/mL Final    Methadone 12/18/2023 NEGATIVE  <100 ng/mL Final    Opiates 12/18/2023 NEGATIVE CONFIRMED  <100 ng/mL Final    Codeine 12/18/2023 NEGATIVE  <50 ng/mL Final    Hydrocodone 12/18/2023 NEGATIVE  <50 ng/mL Final    Hydromorphone 12/18/2023 NEGATIVE  <50  ng/mL Final    Morphine 12/18/2023 NEGATIVE  <50 ng/mL Final    NORHYDROCODONE 12/18/2023 NEGATIVE  <50 ng/mL Final    Opiates Comments 12/18/2023    Final    Oxycodone 12/18/2023 POSITIVE (A)  <100 ng/mL Final    NOROXYCODONE 12/18/2023 >39195 (H)  <50 ng/mL Final    Oxycodone 12/18/2023 4,896 (H)  <50 ng/mL Final    Oxymorphone by GC/MS 12/18/2023 2,628 (H)  <50 ng/mL Final    Oxycodone Comments 12/18/2023    Final    Phencyclidine 12/18/2023 NEGATIVE  <25 ng/mL Final    Creatinine 12/18/2023 69.1  > or = 20.0 mg/dL Final    pH 12/18/2023 6.0  4.5 - 9.0 Final    Oxidants, Urine (Tox) 12/18/2023 NEGATIVE  <200 mcg/mL Final    Notes and Comments 12/18/2023    Final       Past Medical History:   Diagnosis Date    ADHD (attention deficit hyperactivity disorder)     Depression     Digestive disorder     colon polyps    PUD (peptic ulcer disease) 1990    blood transfusion     Social History     Socioeconomic History    Marital status: Single   Tobacco Use    Smoking status: Passive Smoke Exposure - Never Smoker    Smokeless tobacco: Never    Tobacco comments:     Vaping   Substance and Sexual Activity    Alcohol use: Not Currently     Comment: No drinks    Drug use: Yes    Sexual activity: Not Currently     Partners: Female     Birth control/protection: Condom   Social History Narrative    ** Merged History Encounter **          Social Determinants of Health     Financial Resource Strain: Low Risk  (3/21/2024)    Overall Financial Resource Strain (CARDIA)     Difficulty of Paying Living Expenses: Not hard at all   Food Insecurity: No Food Insecurity (3/21/2024)    Hunger Vital Sign     Worried About Running Out of Food in the Last Year: Never true     Ran Out of Food in the Last Year: Never true   Transportation Needs: No Transportation Needs (3/21/2024)    PRAPARE - Transportation     Lack of Transportation (Medical): No     Lack of Transportation (Non-Medical): No   Physical Activity: Insufficiently Active (3/21/2024)     Exercise Vital Sign     Days of Exercise per Week: 2 days     Minutes of Exercise per Session: 30 min   Stress: No Stress Concern Present (3/21/2024)    [employer] - Occupational Stress Questionnaire     Feeling of Stress : Only a little   Social Connections: Unknown (3/21/2024)    Social Connection and Isolation Panel [NHANES]     Frequency of Communication with Friends and Family: Twice a week     Frequency of Social Gatherings with Friends and Family: Three times a week     Active Member of Clubs or Organizations: No     Attends Club or Organization Meetings: Never     Marital Status:    Housing Stability: High Risk (3/21/2024)    Housing Stability Vital Sign     Unable to Pay for Housing in the Last Year: Yes     Unstable Housing in the Last Year: No     Past Surgical History:   Procedure Laterality Date    COLONOSCOPY N/A 09/17/2021    Procedure: COLONOSCOPY;  Surgeon: Jesse Ambrosio MD;  Location: [facility];  Service: Endoscopy;  Laterality: N/A;    COLONOSCOPY N/A 03/07/2023    Procedure: COLONOSCOPY;  Surgeon: Jorge A Pereyra III, MD;  Location: [facility];  Service: Endoscopy;  Laterality: N/A;    COLONOSCOPY W/ POLYPECTOMY  03/07/2023    ESOPHAGOGASTRODUODENOSCOPY N/A 07/16/2021    Procedure: EGD (ESOPHAGOGASTRODUODENOSCOPY);  Surgeon: Jesse Ambrosio MD;  Location: [facility];  Service: Endoscopy;  Laterality: N/A;    KNEE ARTHROSCOPY Right 1990    TOTAL REPLACEMENT OF CERVICAL INTERVERTEBRAL DISC N/A 08/21/2023     Family History   Problem Relation Age of Onset    No Known Problems Mother     Cancer Father     Early death Father        Review of patient's allergies indicates:   Allergen Reactions    Aspirin      Gastric ulcers       Current Outpatient Medications:     carisoprodoL (SOMA) 350 MG tablet, Take 1 tablet (350 mg total) by mouth 3 (three) times daily as needed for Muscle spasms., Disp: 90 tablet, Rfl: 0    dextroamphetamine-amphetamine (ADDERALL  "XR) 20 MG 24 hr capsule, Take 20 mg by mouth 2 (two) times a day. , Disp: , Rfl:     lisinopriL 10 MG tablet, Take 1 tablet (10 mg total) by mouth daily as needed (SBP>145/95)., Disp: 30 tablet, Rfl: 3    oxyCODONE-acetaminophen (PERCOCET)  mg per tablet, Take 1 tablet by mouth every 6 (six) hours as needed for Pain., Disp: 120 tablet, Rfl: 0    pantoprazole (PROTONIX) 40 MG tablet, Take 1 tablet (40 mg total) by mouth once daily., Disp: 90 tablet, Rfl: 3    Review of Systems   Constitutional:  Negative for activity change, appetite change, fatigue, fever and unexpected weight change.   HENT:  Negative for hearing loss, rhinorrhea and trouble swallowing.    Eyes:  Negative for discharge and visual disturbance.   Respiratory:  Negative for cough, chest tightness, shortness of breath and wheezing.    Cardiovascular:  Negative for chest pain, palpitations and leg swelling.   Gastrointestinal:  Negative for abdominal pain, blood in stool, constipation, diarrhea, nausea and vomiting.        -heartburn   Endocrine: Negative for polydipsia and polyuria.   Genitourinary:  Negative for decreased urine volume, difficulty urinating, dysuria, frequency, hematuria and urgency.   Musculoskeletal:  Positive for back pain and neck pain. Negative for arthralgias, joint swelling and myalgias.   Skin:  Negative for rash.   Neurological:  Negative for dizziness, weakness, numbness and headaches.   Hematological:  Does not bruise/bleed easily.   Psychiatric/Behavioral:  Negative for confusion, decreased concentration, dysphoric mood, sleep disturbance and suicidal ideas. The patient is not nervous/anxious.    All other systems reviewed and are negative.         Objective:      Vitals:    03/21/24 1537   BP: 138/78   Pulse: 100   SpO2: 99%   Weight: 58.1 kg (128 lb)   Height: 6' 2" (1.88 m)       Wt Readings from Last 3 Encounters:   03/21/24 58.1 kg (128 lb)   12/14/23 59.9 kg (132 lb)   09/13/23 56.7 kg (125 lb)         Physical " Exam  Vitals reviewed.   Constitutional:       General: He is not in acute distress.     Appearance: Normal appearance. He is well-developed.   HENT:      Head: Normocephalic and atraumatic.   Neck:      Thyroid: No thyromegaly.   Cardiovascular:      Rate and Rhythm: Normal rate and regular rhythm.      Heart sounds: Normal heart sounds. No murmur heard.     No friction rub.   Pulmonary:      Effort: Pulmonary effort is normal.      Breath sounds: Normal breath sounds. No wheezing or rales.   Abdominal:      General: Bowel sounds are normal. There is no distension.      Palpations: Abdomen is soft.      Tenderness: There is no abdominal tenderness.   Musculoskeletal:      Cervical back: Neck supple.   Lymphadenopathy:      Cervical: No cervical adenopathy.   Skin:     General: Skin is warm and dry.      Findings: No rash.   Neurological:      Mental Status: He is alert and oriented to person, place, and time.   Psychiatric:         Attention and Perception: He is attentive.         Speech: Speech normal.         Behavior: Behavior normal.         Thought Content: Thought content normal.         Judgment: Judgment normal.           Assessment:       1. Chronic neck pain    2. Muscle spasm    3. Primary hypertension    4. Encounter for therapeutic drug monitoring    5. Encounter for long-term (current) use of other medications    6. Medication refill             Plan:       Chronic neck pain  Comments:  Pain improved since being on soma. Will continue to monitor pain control and function. To continue to use TENS, heat, posture. Will increase his pain med.  Orders:  -     oxyCODONE-acetaminophen (PERCOCET)  mg per tablet; Take 1 tablet by mouth every 6 (six) hours as needed for Pain.  Dispense: 120 tablet; Refill: 0    Muscle spasm  Comments:  Controlled. Will continue to monitor symptoms on soma    Primary hypertension  Comments:  Controlled. Will continue to monitor symptoms on BP on current medication  regimen    Encounter for therapeutic drug monitoring    Encounter for long-term (current) use of other medications    Medication refill  -     pantoprazole (PROTONIX) 40 MG tablet; Take 1 tablet (40 mg total) by mouth once daily.  Dispense: 90 tablet; Refill: 3        Labs and/or tests have been ordered for the evaluation/monitoring of acute/chronic conditions, to be done just before next visit.    Chronic Pain Notes:  Have discussed the risks and benefits of chronic narcotic therapy including pain control, dependence, and addiction.  The patient is aware and accepts the risks and benefits.  Patient is aware of the risk of taking narcotics combined with benzodiazepines/muscle relaxers/hypnotics, including but not limited to breathing difficulties, coma, and death; accepts the risks; and agrees to use medications only as prescribed.      Follow up in about 3 months (around 6/21/2024).        3/21/2024 Sherri Snow

## 2024-04-11 DIAGNOSIS — M62.838 MUSCLE SPASM: ICD-10-CM

## 2024-04-11 RX ORDER — CARISOPRODOL 350 MG/1
350 TABLET ORAL 3 TIMES DAILY PRN
Qty: 90 TABLET | Refills: 0 | Status: SHIPPED | OUTPATIENT
Start: 2024-04-11 | End: 2024-05-16 | Stop reason: SDUPTHER

## 2024-04-11 NOTE — TELEPHONE ENCOUNTER
The patient's prescription has been approved and sent to   [facility]  [address]  [facility] [address]  Phone: [phone] Fax: [phone]

## 2024-04-29 DIAGNOSIS — G89.29 CHRONIC NECK PAIN: ICD-10-CM

## 2024-04-29 DIAGNOSIS — M54.2 CHRONIC NECK PAIN: ICD-10-CM

## 2024-04-29 RX ORDER — OXYCODONE AND ACETAMINOPHEN 10; 325 MG/1; MG/1
1 TABLET ORAL EVERY 6 HOURS PRN
Qty: 120 TABLET | Refills: 0 | Status: SHIPPED | OUTPATIENT
Start: 2024-04-29 | End: 2024-05-30 | Stop reason: SDUPTHER

## 2024-04-30 NOTE — TELEPHONE ENCOUNTER
prescription sent to   [facility] [address]  [facility] [address]  Phone: [phone] Fax: [phone]

## 2024-05-14 DIAGNOSIS — M62.838 MUSCLE SPASM: ICD-10-CM

## 2024-05-14 RX ORDER — CARISOPRODOL 350 MG/1
350 TABLET ORAL 3 TIMES DAILY PRN
Qty: 90 TABLET | Refills: 0 | OUTPATIENT
Start: 2024-05-14

## 2024-05-15 RX ORDER — CARISOPRODOL 350 MG/1
350 TABLET ORAL 3 TIMES DAILY PRN
Qty: 90 TABLET | Refills: 0 | OUTPATIENT
Start: 2024-05-15

## 2024-05-16 DIAGNOSIS — M62.838 MUSCLE SPASM: ICD-10-CM

## 2024-05-16 RX ORDER — CARISOPRODOL 350 MG/1
350 TABLET ORAL 3 TIMES DAILY PRN
Qty: 90 TABLET | Refills: 0 | Status: SHIPPED | OUTPATIENT
Start: 2024-05-16 | End: 2024-06-14 | Stop reason: SDUPTHER

## 2024-05-16 NOTE — TELEPHONE ENCOUNTER
Please see the attached refill request.  Next Appt this Specialty: With Family Medicine (Sherri Snow MD)  06/24/2024 at 3:40 PM

## 2024-05-16 NOTE — TELEPHONE ENCOUNTER
The patient's prescription has been approved and sent to   [facility]  [address]  [facility] [address]  Phone: [phone] Fax: [phone]

## 2024-05-28 DIAGNOSIS — G89.29 CHRONIC NECK PAIN: ICD-10-CM

## 2024-05-28 DIAGNOSIS — M54.2 CHRONIC NECK PAIN: ICD-10-CM

## 2024-05-29 DIAGNOSIS — G89.29 CHRONIC NECK PAIN: ICD-10-CM

## 2024-05-29 DIAGNOSIS — M54.2 CHRONIC NECK PAIN: ICD-10-CM

## 2024-05-29 RX ORDER — OXYCODONE AND ACETAMINOPHEN 10; 325 MG/1; MG/1
1 TABLET ORAL EVERY 6 HOURS PRN
Qty: 120 TABLET | Refills: 0 | OUTPATIENT
Start: 2024-05-29

## 2024-05-30 DIAGNOSIS — M54.2 CHRONIC NECK PAIN: ICD-10-CM

## 2024-05-30 DIAGNOSIS — G89.29 CHRONIC NECK PAIN: ICD-10-CM

## 2024-05-30 RX ORDER — OXYCODONE AND ACETAMINOPHEN 10; 325 MG/1; MG/1
1 TABLET ORAL EVERY 6 HOURS PRN
Qty: 120 TABLET | Refills: 0 | Status: SHIPPED | OUTPATIENT
Start: 2024-05-30

## 2024-05-30 RX ORDER — OXYCODONE AND ACETAMINOPHEN 10; 325 MG/1; MG/1
1 TABLET ORAL EVERY 6 HOURS PRN
Qty: 120 TABLET | Refills: 0 | OUTPATIENT
Start: 2024-05-30

## 2024-05-30 NOTE — TELEPHONE ENCOUNTER
----- Message from Fadumo May sent at 5/30/2024 11:32 AM CDT -----  Regarding: med refill  Needs percocet [facility] says he thought he put in a request but wasn't sure thanks kbb

## 2024-05-30 NOTE — TELEPHONE ENCOUNTER
The patient's prescription has been approved and sent to   [facility]  [address]  [place] [address]  Phone: [phone] Fax: [phone]

## 2024-06-14 DIAGNOSIS — M62.838 MUSCLE SPASM: ICD-10-CM

## 2024-06-14 RX ORDER — CARISOPRODOL 350 MG/1
350 TABLET ORAL 3 TIMES DAILY PRN
Qty: 90 TABLET | Refills: 0 | Status: SHIPPED | OUTPATIENT
Start: 2024-06-14

## 2024-06-22 NOTE — PROGRESS NOTES
SUBJECTIVE:    Patient ID: Beka Bee is a 64 y.o. male.    Chief Complaint: Follow-up (3 mo f/u//no med bottles//last dose oxycodone 2-3 days ago, last dose soma yesterday//complains of nausea and worsening neck pain. States that insurance company will not approve carisoprodol//tc)    Pt here to checkup on acute and chronic conditions.    PMHx of cervical DDD, GERD, ADD.    Pt recently did ROMAN procedure, without anesthesia and he is a little sore ([place]) about 2 weeks ago. States it has been bothering him (his neck) since she got the shot. Has run out of his pain med, which he is prescribed to take every 8 hours. States his soma does help with spasms.   Wearing a posture brace in the daytime and uses his tens at night with a heating pad.      Worked as a , but has not worked since January.  Just found out last week that he has been officially fired.  Has been taking his pain med every 8 hours but it doesn't last.   NS ([place]) did surgery 8/21/2023, total replacement of cervical intervertebral disk. (Currently on Disability at work). Told he could loose 20-30% of his movement. Bone stimulator was not approved.    Has chronic back pain. Has left sciatic back pain.  Has tried in the past ibuprofen, naproxen in the past without relief.  Has also used tizanidine in the past and has failed it and failed robaxin and flexeril)  Soma and valium has helped.   Weight is stable.      Has not had labs done.  Has been losing weight. States he has been eating.   Sleeps ok at night. His psychiatrist gives him something at night.     Sometimes when he turns his head a certain way,gets a burning sensation in his neck and then he will get nauseated. Taking protonix helps. Has also taken zofran for it as well.     Has had labs done.  ------------------------------------------------------------------------  Cscope 3/2023        No visits with results within 6 Month(s) from this visit.   Latest known  visit with results is:   Office Visit on 12/14/2023   Component Date Value Ref Range Status    Amphetamines 12/18/2023 POSITIVE (A)  <500 ng/mL Final    Amphetamine 12/18/2023 7,216 (H)  <250 ng/mL Final    Methamphetamine 12/18/2023 NEGATIVE  <250 ng/mL Final    Amphetamines Comments 12/18/2023    Final    Barbiturates 12/18/2023 NEGATIVE  <300 ng/mL Final    Benzodiazepines 12/18/2023 NEGATIVE  <100 ng/mL Final    Cocaine Metabolites 12/18/2023 NEGATIVE  <150 ng/mL Final    Methadone 12/18/2023 NEGATIVE  <100 ng/mL Final    Opiates 12/18/2023 NEGATIVE CONFIRMED  <100 ng/mL Final    Codeine 12/18/2023 NEGATIVE  <50 ng/mL Final    Hydrocodone 12/18/2023 NEGATIVE  <50 ng/mL Final    Hydromorphone 12/18/2023 NEGATIVE  <50 ng/mL Final    Morphine 12/18/2023 NEGATIVE  <50 ng/mL Final    NORHYDROCODONE 12/18/2023 NEGATIVE  <50 ng/mL Final    Opiates Comments 12/18/2023    Final    Oxycodone 12/18/2023 POSITIVE (A)  <100 ng/mL Final    NOROXYCODONE 12/18/2023 >62940 (H)  <50 ng/mL Final    Oxycodone 12/18/2023 4,896 (H)  <50 ng/mL Final    Oxymorphone by GC/MS 12/18/2023 2,628 (H)  <50 ng/mL Final    Oxycodone Comments 12/18/2023    Final    Phencyclidine 12/18/2023 NEGATIVE  <25 ng/mL Final    Creatinine 12/18/2023 69.1  > or = 20.0 mg/dL Final    pH 12/18/2023 6.0  4.5 - 9.0 Final    Oxidants, Urine (Tox) 12/18/2023 NEGATIVE  <200 mcg/mL Final    Notes and Comments 12/18/2023    Final       Past Medical History:   Diagnosis Date    ADHD (attention deficit hyperactivity disorder)     Depression     Digestive disorder     colon polyps    PUD (peptic ulcer disease) 1990    blood transfusion     Social History     Socioeconomic History    Marital status: Single   Tobacco Use    Smoking status: Never     Passive exposure: Yes    Smokeless tobacco: Never    Tobacco comments:     Vaping   Substance and Sexual Activity    Alcohol use: Not Currently     Comment: No drinks    Drug use: Yes    Sexual activity: Not Currently      Partners: Female     Birth control/protection: Condom   Social History Narrative    ** Merged History Encounter **          Social Determinants of Health     Financial Resource Strain: Low Risk  (3/21/2024)    Overall Financial Resource Strain (CARDIA)     Difficulty of Paying Living Expenses: Not hard at all   Food Insecurity: No Food Insecurity (3/21/2024)    Hunger Vital Sign     Worried About Running Out of Food in the Last Year: Never true     Ran Out of Food in the Last Year: Never true   Transportation Needs: No Transportation Needs (3/21/2024)    PRAPARE - Transportation     Lack of Transportation (Medical): No     Lack of Transportation (Non-Medical): No   Physical Activity: Insufficiently Active (3/21/2024)    Exercise Vital Sign     Days of Exercise per Week: 2 days     Minutes of Exercise per Session: 30 min   Stress: No Stress Concern Present (3/21/2024)    [employer] [place] [employer] - Occupational Stress Questionnaire     Feeling of Stress : Only a little   Housing Stability: High Risk (3/21/2024)    Housing Stability Vital Sign     Unable to Pay for Housing in the Last Year: Yes     Unstable Housing in the Last Year: No     Past Surgical History:   Procedure Laterality Date    COLONOSCOPY N/A 09/17/2021    Procedure: COLONOSCOPY;  Surgeon: Jesse Ambrosio MD;  Location: [facility];  Service: Endoscopy;  Laterality: N/A;    COLONOSCOPY N/A 03/07/2023    Procedure: COLONOSCOPY;  Surgeon: Jorge A Pereyra III, MD;  Location: [facility];  Service: Endoscopy;  Laterality: N/A;    COLONOSCOPY W/ POLYPECTOMY  03/07/2023    EPIDURAL STEROID INJECTION INTO CERVICAL SPINE  06/10/2024    ESOPHAGOGASTRODUODENOSCOPY N/A 07/16/2021    Procedure: EGD (ESOPHAGOGASTRODUODENOSCOPY);  Surgeon: Jesse Ambrosio MD;  Location: [facility];  Service: Endoscopy;  Laterality: N/A;    KNEE ARTHROSCOPY Right 1990    TOTAL REPLACEMENT OF CERVICAL INTERVERTEBRAL DISC N/A 08/21/2023     Family History   Problem  Relation Name Age of Onset    No Known Problems Mother      Cancer Father Ricardo Bee     Early death Father Ricardo Bee        Review of patient's allergies indicates:   Allergen Reactions    Aspirin      Gastric ulcers       Current Outpatient Medications:     dextroamphetamine-amphetamine (ADDERALL XR) 20 MG 24 hr capsule, Take 20 mg by mouth 2 (two) times a day. , Disp: , Rfl:     lisinopriL 10 MG tablet, Take 1 tablet (10 mg total) by mouth daily as needed (SBP>145/95)., Disp: 30 tablet, Rfl: 3    oxyCODONE-acetaminophen (PERCOCET)  mg per tablet, Take 1 tablet by mouth every 6 (six) hours as needed for Pain., Disp: 120 tablet, Rfl: 0    pantoprazole (PROTONIX) 40 MG tablet, Take 1 tablet (40 mg total) by mouth once daily., Disp: 90 tablet, Rfl: 3    carisoprodoL (SOMA) 350 MG tablet, Take 1 tablet (350 mg total) by mouth 3 (three) times daily as needed for Muscle spasms., Disp: 90 tablet, Rfl: 0    ondansetron (ZOFRAN-ODT) 4 MG TbDL, Take 1 tablet (4 mg total) by mouth every 6 (six) hours as needed (nausea)., Disp: 28 tablet, Rfl: 1    Review of Systems   Constitutional:  Negative for activity change, appetite change, fatigue, fever and unexpected weight change.   HENT:  Negative for hearing loss, rhinorrhea and trouble swallowing.    Eyes:  Negative for discharge and visual disturbance.   Respiratory:  Negative for cough, chest tightness, shortness of breath and wheezing.    Cardiovascular:  Negative for chest pain, palpitations and leg swelling.   Gastrointestinal:  Negative for abdominal pain, blood in stool, constipation, diarrhea, nausea and vomiting.        -heartburn   Endocrine: Negative for polydipsia and polyuria.   Genitourinary:  Negative for decreased urine volume, difficulty urinating, dysuria, frequency, hematuria and urgency.   Musculoskeletal:  Positive for back pain and neck pain. Negative for arthralgias, joint swelling and myalgias.   Skin:  Negative for rash.   Neurological:   "Negative for dizziness, weakness, numbness and headaches.   Hematological:  Does not bruise/bleed easily.   Psychiatric/Behavioral:  Negative for confusion, decreased concentration, dysphoric mood, sleep disturbance and suicidal ideas. The patient is not nervous/anxious.    All other systems reviewed and are negative.         Objective:      Vitals:    06/24/24 1526 06/24/24 1538   BP: (!) 164/84 (!) 142/80   Pulse: 78    Weight: 56.2 kg (124 lb)    Height: 6' 2" (1.88 m)          Wt Readings from Last 3 Encounters:   06/24/24 56.2 kg (124 lb)   03/21/24 58.1 kg (128 lb)   12/14/23 59.9 kg (132 lb)         Physical Exam  Vitals reviewed.   Constitutional:       General: He is not in acute distress.     Appearance: Normal appearance. He is well-developed.   HENT:      Head: Normocephalic and atraumatic.   Neck:      Thyroid: No thyromegaly.   Cardiovascular:      Rate and Rhythm: Normal rate and regular rhythm.      Heart sounds: Normal heart sounds. No murmur heard.     No friction rub.   Pulmonary:      Effort: Pulmonary effort is normal.      Breath sounds: Normal breath sounds. No wheezing or rales.   Abdominal:      General: Bowel sounds are normal. There is no distension.      Palpations: Abdomen is soft.      Tenderness: There is no abdominal tenderness.   Musculoskeletal:      Cervical back: Neck supple.   Lymphadenopathy:      Cervical: No cervical adenopathy.   Skin:     General: Skin is warm and dry.      Findings: No rash.   Neurological:      Mental Status: He is alert and oriented to person, place, and time.   Psychiatric:         Attention and Perception: He is attentive.         Speech: Speech normal.         Behavior: Behavior normal.         Thought Content: Thought content normal.         Judgment: Judgment normal.           Assessment:       1. Chronic neck pain    2. Muscle spasm    3. Primary hypertension    4. Nausea    5. Encounter for therapeutic drug monitoring    6. Encounter for long-term " (current) use of other medications               Plan:       Chronic neck pain  Comments:  Symptomatic. To continue to follow with PM. Will continue to monitor pain control and function    Muscle spasm  Comments:  Controlled. Will continue to monitor symptoms    Primary hypertension  Comments:  Controlled. Will continue to monitor BP on current medication regimen    Nausea  Comments:  Intermittent. Will continue to monitor symptoms  Orders:  -     ondansetron (ZOFRAN-ODT) 4 MG TbDL; Take 1 tablet (4 mg total) by mouth every 6 (six) hours as needed (nausea).  Dispense: 28 tablet; Refill: 1    Encounter for therapeutic drug monitoring  -     DRUG MONITOR, PANEL 4, W/CONF, URINE; Future; Expected date: 06/24/2024    Encounter for long-term (current) use of other medications  -     DRUG MONITOR, PANEL 4, W/CONF, URINE; Future; Expected date: 06/24/2024          Labs and/or tests have been ordered for the evaluation/monitoring of acute/chronic conditions, to be done just before next visit.    Chronic Pain Notes:  Have discussed the risks and benefits of chronic narcotic therapy including pain control, dependence, and addiction.  The patient is aware and accepts the risks and benefits.  Patient is aware of the risk of taking narcotics combined with benzodiazepines/muscle relaxers/hypnotics, including but not limited to breathing difficulties, coma, and death; accepts the risks; and agrees to use medications only as prescribed.      No follow-ups on file.        6/24/2024 Sherri Snow

## 2024-06-24 ENCOUNTER — TELEPHONE (OUTPATIENT)
Dept: FAMILY MEDICINE | Facility: CLINIC | Age: 64
End: 2024-06-24

## 2024-06-24 ENCOUNTER — OFFICE VISIT (OUTPATIENT)
Dept: FAMILY MEDICINE | Facility: CLINIC | Age: 64
End: 2024-06-24
Payer: MEDICARE

## 2024-06-24 VITALS
WEIGHT: 124 LBS | HEART RATE: 78 BPM | SYSTOLIC BLOOD PRESSURE: 142 MMHG | HEIGHT: 74 IN | DIASTOLIC BLOOD PRESSURE: 80 MMHG | BODY MASS INDEX: 15.92 KG/M2

## 2024-06-24 DIAGNOSIS — M54.2 CHRONIC NECK PAIN: ICD-10-CM

## 2024-06-24 DIAGNOSIS — M54.2 CHRONIC NECK PAIN: Primary | ICD-10-CM

## 2024-06-24 DIAGNOSIS — Z79.899 ENCOUNTER FOR LONG-TERM (CURRENT) USE OF OTHER MEDICATIONS: ICD-10-CM

## 2024-06-24 DIAGNOSIS — R11.0 NAUSEA: ICD-10-CM

## 2024-06-24 DIAGNOSIS — I10 PRIMARY HYPERTENSION: ICD-10-CM

## 2024-06-24 DIAGNOSIS — Z51.81 ENCOUNTER FOR THERAPEUTIC DRUG MONITORING: ICD-10-CM

## 2024-06-24 DIAGNOSIS — G89.29 CHRONIC NECK PAIN: ICD-10-CM

## 2024-06-24 DIAGNOSIS — M62.838 MUSCLE SPASM: ICD-10-CM

## 2024-06-24 DIAGNOSIS — G89.29 CHRONIC NECK PAIN: Primary | ICD-10-CM

## 2024-06-24 PROCEDURE — 1160F RVW MEDS BY RX/DR IN RCRD: CPT | Mod: CPTII,S$GLB,, | Performed by: FAMILY MEDICINE

## 2024-06-24 PROCEDURE — 3008F BODY MASS INDEX DOCD: CPT | Mod: CPTII,S$GLB,, | Performed by: FAMILY MEDICINE

## 2024-06-24 PROCEDURE — 3288F FALL RISK ASSESSMENT DOCD: CPT | Mod: CPTII,S$GLB,, | Performed by: FAMILY MEDICINE

## 2024-06-24 PROCEDURE — 1101F PT FALLS ASSESS-DOCD LE1/YR: CPT | Mod: CPTII,S$GLB,, | Performed by: FAMILY MEDICINE

## 2024-06-24 PROCEDURE — 1159F MED LIST DOCD IN RCRD: CPT | Mod: CPTII,S$GLB,, | Performed by: FAMILY MEDICINE

## 2024-06-24 PROCEDURE — 99214 OFFICE O/P EST MOD 30 MIN: CPT | Mod: S$GLB,,, | Performed by: FAMILY MEDICINE

## 2024-06-24 PROCEDURE — 3077F SYST BP >= 140 MM HG: CPT | Mod: CPTII,S$GLB,, | Performed by: FAMILY MEDICINE

## 2024-06-24 PROCEDURE — 4010F ACE/ARB THERAPY RXD/TAKEN: CPT | Mod: CPTII,S$GLB,, | Performed by: FAMILY MEDICINE

## 2024-06-24 PROCEDURE — 3079F DIAST BP 80-89 MM HG: CPT | Mod: CPTII,S$GLB,, | Performed by: FAMILY MEDICINE

## 2024-06-24 RX ORDER — ONDANSETRON 4 MG/1
4 TABLET, ORALLY DISINTEGRATING ORAL EVERY 6 HOURS PRN
Qty: 28 TABLET | Refills: 1 | Status: SHIPPED | OUTPATIENT
Start: 2024-06-24

## 2024-06-24 NOTE — TELEPHONE ENCOUNTER
----- Message from Mckayla Morris sent at 6/24/2024  4:06 PM CDT -----  Pt needs to schedule a  3 month f/u.    [phone]

## 2024-06-25 ENCOUNTER — TELEPHONE (OUTPATIENT)
Dept: FAMILY MEDICINE | Facility: CLINIC | Age: 64
End: 2024-06-25
Payer: MEDICARE

## 2024-06-25 DIAGNOSIS — M54.2 CHRONIC NECK PAIN: ICD-10-CM

## 2024-06-25 DIAGNOSIS — G89.29 CHRONIC NECK PAIN: ICD-10-CM

## 2024-06-25 RX ORDER — OXYCODONE AND ACETAMINOPHEN 10; 325 MG/1; MG/1
1 TABLET ORAL EVERY 6 HOURS PRN
Qty: 120 TABLET | Refills: 0 | Status: SHIPPED | OUTPATIENT
Start: 2024-06-28

## 2024-06-25 RX ORDER — OXYCODONE AND ACETAMINOPHEN 10; 325 MG/1; MG/1
1 TABLET ORAL EVERY 6 HOURS PRN
Qty: 120 TABLET | Refills: 0 | OUTPATIENT
Start: 2024-06-25

## 2024-06-25 NOTE — TELEPHONE ENCOUNTER
----- Message from Gabby Brooks sent at 6/25/2024  2:19 PM CDT -----  Pt is a returning a phone call.   [phone]

## 2024-06-26 NOTE — TELEPHONE ENCOUNTER
The patient's prescription has been approved and sent to   [facility]  [address]  [place] [address]  Phone: [phone] Fax: [phone]

## 2024-07-11 DIAGNOSIS — M62.838 MUSCLE SPASM: ICD-10-CM

## 2024-07-11 RX ORDER — CARISOPRODOL 350 MG/1
350 TABLET ORAL 3 TIMES DAILY PRN
Qty: 90 TABLET | Refills: 0 | Status: SHIPPED | OUTPATIENT
Start: 2024-07-12

## 2024-07-11 NOTE — TELEPHONE ENCOUNTER
The patient's prescription has been approved and sent to   [facility]  [address]  [place] [address]  Phone: [phone] Fax: [phone]

## 2024-07-11 NOTE — TELEPHONE ENCOUNTER
Please see the attached refill request.  Next Appt this Specialty: With Family Medicine (Sherri Snow MD)  09/25/2024 at 3:40 PM

## 2024-07-26 DIAGNOSIS — M54.2 CHRONIC NECK PAIN: ICD-10-CM

## 2024-07-26 DIAGNOSIS — G89.29 CHRONIC NECK PAIN: ICD-10-CM

## 2024-07-26 RX ORDER — OXYCODONE AND ACETAMINOPHEN 10; 325 MG/1; MG/1
1 TABLET ORAL EVERY 6 HOURS PRN
Qty: 120 TABLET | Refills: 0 | Status: SHIPPED | OUTPATIENT
Start: 2024-07-26

## 2024-07-26 NOTE — TELEPHONE ENCOUNTER
The patient's prescription has been approved and sent to   [facility]  [address]  [place] [facility] [address]  Phone: [phone] Fax: [phone]

## 2024-08-09 DIAGNOSIS — M62.838 MUSCLE SPASM: ICD-10-CM

## 2024-08-09 RX ORDER — CARISOPRODOL 350 MG/1
350 TABLET ORAL 3 TIMES DAILY PRN
Qty: 90 TABLET | Refills: 0 | Status: SHIPPED | OUTPATIENT
Start: 2024-08-14

## 2024-08-23 DIAGNOSIS — M54.2 CHRONIC NECK PAIN: ICD-10-CM

## 2024-08-23 DIAGNOSIS — G89.29 CHRONIC NECK PAIN: ICD-10-CM

## 2024-08-23 RX ORDER — OXYCODONE AND ACETAMINOPHEN 10; 325 MG/1; MG/1
1 TABLET ORAL EVERY 6 HOURS PRN
Qty: 120 TABLET | Refills: 0 | Status: SHIPPED | OUTPATIENT
Start: 2024-08-28

## 2024-08-23 NOTE — TELEPHONE ENCOUNTER
The patient's prescription has been approved and sent to   [facility]  [address]  [place] [address]  Phone: [phone] Fax: [phone]

## 2024-08-26 DIAGNOSIS — G89.29 CHRONIC NECK PAIN: ICD-10-CM

## 2024-08-26 DIAGNOSIS — M54.2 CHRONIC NECK PAIN: ICD-10-CM

## 2024-08-26 RX ORDER — OXYCODONE AND ACETAMINOPHEN 10; 325 MG/1; MG/1
1 TABLET ORAL EVERY 6 HOURS PRN
Qty: 120 TABLET | Refills: 0 | Status: SHIPPED | OUTPATIENT
Start: 2024-08-26

## 2024-08-26 NOTE — TELEPHONE ENCOUNTER
----- Message from Gabby Brooks sent at 8/26/2024 11:22 AM CDT -----  Pharmacy is unable to fill his pain medication due to date on the medication to fill. Pt is out of medication.   [facility]/ [facility]   [phone]

## 2024-08-26 NOTE — TELEPHONE ENCOUNTER
The patient's prescription has been approved and sent to   [facility]  [address]  [place] [address]  Phone: [phone] Fax: [phone]

## 2024-08-31 DIAGNOSIS — R11.0 NAUSEA: ICD-10-CM

## 2024-09-03 RX ORDER — ONDANSETRON 4 MG/1
4 TABLET, ORALLY DISINTEGRATING ORAL EVERY 6 HOURS PRN
Qty: 28 TABLET | Refills: 1 | OUTPATIENT
Start: 2024-09-03

## 2024-09-10 DIAGNOSIS — M62.838 MUSCLE SPASM: ICD-10-CM

## 2024-09-10 DIAGNOSIS — R11.0 NAUSEA: ICD-10-CM

## 2024-09-10 RX ORDER — CARISOPRODOL 350 MG/1
350 TABLET ORAL 3 TIMES DAILY PRN
Qty: 90 TABLET | Refills: 0 | Status: SHIPPED | OUTPATIENT
Start: 2024-09-10

## 2024-09-10 NOTE — TELEPHONE ENCOUNTER
The patient's prescription has been approved and sent to   [facility]  [address]  [place] [address]  Phone: [phone] Fax: [phone]

## 2024-09-12 RX ORDER — ONDANSETRON 4 MG/1
4 TABLET, ORALLY DISINTEGRATING ORAL EVERY 6 HOURS PRN
Qty: 28 TABLET | Refills: 1 | Status: SHIPPED | OUTPATIENT
Start: 2024-09-12

## 2024-09-12 NOTE — TELEPHONE ENCOUNTER
The patient's prescription has been approved and sent to   [facility]  [address]  [place] [facility] [address]  Phone: [phone] Fax: [phone]

## 2024-09-13 ENCOUNTER — OFFICE VISIT (OUTPATIENT)
Dept: URGENT CARE | Facility: CLINIC | Age: 64
End: 2024-09-13
Payer: MEDICARE

## 2024-09-13 VITALS
DIASTOLIC BLOOD PRESSURE: 88 MMHG | SYSTOLIC BLOOD PRESSURE: 150 MMHG | HEIGHT: 74 IN | TEMPERATURE: 99 F | WEIGHT: 140 LBS | BODY MASS INDEX: 17.97 KG/M2 | RESPIRATION RATE: 18 BRPM | OXYGEN SATURATION: 97 % | HEART RATE: 106 BPM

## 2024-09-13 DIAGNOSIS — L08.9 SKIN INFECTION: ICD-10-CM

## 2024-09-13 DIAGNOSIS — S61.209A OPEN WOUND OF FINGER, INITIAL ENCOUNTER: Primary | ICD-10-CM

## 2024-09-13 RX ORDER — DOXYCYCLINE HYCLATE 100 MG
100 TABLET ORAL 2 TIMES DAILY
Qty: 14 TABLET | Refills: 0 | Status: SHIPPED | OUTPATIENT
Start: 2024-09-13 | End: 2024-09-20

## 2024-09-13 RX ORDER — TEMAZEPAM 15 MG/1
CAPSULE ORAL
COMMUNITY
Start: 2024-06-21

## 2024-09-13 RX ORDER — MUPIROCIN 20 MG/G
OINTMENT TOPICAL 3 TIMES DAILY
Qty: 30 G | Refills: 0 | Status: SHIPPED | OUTPATIENT
Start: 2024-09-13

## 2024-09-13 NOTE — PATIENT INSTRUCTIONS
Doxycycline twice a day for 7 days.  Always take with food and a full glass of water and do not lay down for 1 hour after taking each dose.  Be sure to protect her skin against the sun as doxycycline is well known to cause excessive sun skin sensitivity that could result in severe sunburn, rash, blistering

## 2024-09-13 NOTE — PROGRESS NOTES
"Subjective:      Patient ID: Beka Bee is a 64 y.o. male.    Vitals:  height is 6' 2" (1.88 m) and weight is 63.5 kg (140 lb). His temperature is 98.5 °F (36.9 °C). His blood pressure is 150/88 (abnormal) and his pulse is 106. His respiration is 18 and oxygen saturation is 97%.     Chief Complaint: Infection    Pt c/o possible infection to a sore on his L 2nd digit x's 1 week. Denies fever but has associated joint pain in the DIP.     Infection  This is a new problem. The current episode started 1 to 4 weeks ago (x's 1 week). The problem has been gradually worsening. Associated symptoms include arthralgias and joint swelling. Pertinent negatives include no chills, fatigue or fever. Treatments tried: peroxide and neosporin. The treatment provided no relief.       Constitution: Negative for chills, fatigue and fever.   HENT: Negative.     Respiratory: Negative.     Musculoskeletal:  Positive for joint pain and joint swelling.   Skin:  Positive for wound (left 2nd digit) and erythema.   Neurological: Negative.    Psychiatric/Behavioral: Negative.        Objective:     Physical Exam   Constitutional: He is oriented to person, place, and time. He appears well-developed.   HENT:   Head: Normocephalic and atraumatic. Head is without abrasion, without contusion and without laceration.   Ears:   Right Ear: External ear normal.   Left Ear: External ear normal.   Nose: Nose normal.   Mouth/Throat: Oropharynx is clear and moist and mucous membranes are normal.   Eyes: Conjunctivae, EOM and lids are normal. Pupils are equal, round, and reactive to light.   Neck: Trachea normal and phonation normal. Neck supple.   Cardiovascular: Normal rate.   Pulmonary/Chest: Effort normal. No respiratory distress.   Musculoskeletal: Normal range of motion.         General: Normal range of motion.      Left hand: Left index finger: Exhibits tenderness and decreased ROM. Injuries: blister (wound).   Neurological: He is alert and oriented to " person, place, and time. He displays no weakness.   Skin: Skin is warm, dry, intact and no rash. Capillary refill takes less than 2 seconds. erythema No abrasion, No burn, No bruising and No ecchymosis   Psychiatric: His speech is normal and behavior is normal. Mood, judgment and thought content normal.   Nursing note and vitals reviewed.      Assessment:     1. Open wound of finger, initial encounter    2. Skin infection        Plan:       Open wound of finger, initial encounter  -     doxycycline (VIBRA-TABS) 100 MG tablet; Take 1 tablet (100 mg total) by mouth 2 (two) times daily. for 7 days  Dispense: 14 tablet; Refill: 0  -     mupirocin (BACTROBAN) 2 % ointment; Apply topically 3 (three) times daily.  Dispense: 30 g; Refill: 0    Skin infection  -     doxycycline (VIBRA-TABS) 100 MG tablet; Take 1 tablet (100 mg total) by mouth 2 (two) times daily. for 7 days  Dispense: 14 tablet; Refill: 0      Wound dressed with mupirocin and Band-Aid prior to departure from clinic.    Discussed medication with patient who acknowledges understanding and is agreeable to POC. Follow up with primary care. Increase fluid intake. Red flags for ER discussed.

## 2024-09-22 NOTE — PROGRESS NOTES
SUBJECTIVE:    Patient ID: Beka Bee is a 64 y.o. male.    Chief Complaint: Follow-up (3 mo f/u//no med bottles//complains of neck pain//last dose of oxycodone yesterday, last dose of soma last night//tc)    Pt here to checkup on acute and chronic conditions.    PMHx of cervical DDD, GERD, ADD.    Pt had an ROMAN procedure in his neck that lasted a few days and now he wants to burn some nerves in about 2 weeks ([place]). Takes pain med, which he is prescribed to take every 8 hours. States his soma does help with spasms.   Uses his tens at night with a heating pad.  Has been sleeping with his neck brace at night which is uncomfortable, but states it helps with the pain.     Worked as a , but has not worked since January 2024.  NS ([facility]) did surgery 8/21/2023, total replacement of cervical intervertebral disk. (Currently on Disability at work).   Has chronic back pain. Has left sciatic back pain.  (Failed ibuprofen, naproxen)  (failed tizanidine, robaxin, flexeril)  Soma and valium has helped.       Has lost weight, overall about 5 pounds since last visit.   Sleeps ok at night. His psychiatrist gives him something at night.     Has been having problems with his stomach, with nausea. Wants to get his stomach scoped. Had an ulcer 2 years ago. Thinks it went away. Still taking protonix and zofran as needed. Unsure why he even gets nauseated.     Has had labs done.  ------------------------------------------------------------------------  Cscope 3/2023        No visits with results within 6 Month(s) from this visit.   Latest known visit with results is:   Office Visit on 12/14/2023   Component Date Value Ref Range Status    Amphetamines 12/18/2023 POSITIVE (A)  <500 ng/mL Final    Amphetamine 12/18/2023 7,216 (H)  <250 ng/mL Final    Methamphetamine 12/18/2023 NEGATIVE  <250 ng/mL Final    Amphetamines Comments 12/18/2023    Final    Barbiturates 12/18/2023 NEGATIVE  <300 ng/mL Final     Benzodiazepines 12/18/2023 NEGATIVE  <100 ng/mL Final    Cocaine Metabolites 12/18/2023 NEGATIVE  <150 ng/mL Final    Methadone 12/18/2023 NEGATIVE  <100 ng/mL Final    Opiates 12/18/2023 NEGATIVE CONFIRMED  <100 ng/mL Final    Codeine 12/18/2023 NEGATIVE  <50 ng/mL Final    Hydrocodone 12/18/2023 NEGATIVE  <50 ng/mL Final    Hydromorphone 12/18/2023 NEGATIVE  <50 ng/mL Final    Morphine 12/18/2023 NEGATIVE  <50 ng/mL Final    NORHYDROCODONE 12/18/2023 NEGATIVE  <50 ng/mL Final    Opiates Comments 12/18/2023    Final    Oxycodone 12/18/2023 POSITIVE (A)  <100 ng/mL Final    NOROXYCODONE 12/18/2023 >09110 (H)  <50 ng/mL Final    Oxycodone 12/18/2023 4,896 (H)  <50 ng/mL Final    Oxymorphone by GC/MS 12/18/2023 2,628 (H)  <50 ng/mL Final    Oxycodone Comments 12/18/2023    Final    Phencyclidine 12/18/2023 NEGATIVE  <25 ng/mL Final    Creatinine 12/18/2023 69.1  > or = 20.0 mg/dL Final    pH 12/18/2023 6.0  4.5 - 9.0 Final    Oxidants, Urine (Tox) 12/18/2023 NEGATIVE  <200 mcg/mL Final    Notes and Comments 12/18/2023    Final       Past Medical History:   Diagnosis Date    ADHD (attention deficit hyperactivity disorder)     Depression     Digestive disorder     colon polyps    PUD (peptic ulcer disease) 1990    blood transfusion     Social History     Socioeconomic History    Marital status: Single   Tobacco Use    Smoking status: Never     Passive exposure: Yes    Smokeless tobacco: Never    Tobacco comments:     Vaping   Substance and Sexual Activity    Alcohol use: Not Currently     Comment: No drinks    Drug use: Yes    Sexual activity: Not Currently     Partners: Female     Birth control/protection: Condom   Social History Narrative    ** Merged History Encounter **          Social Determinants of Health     Financial Resource Strain: Low Risk  (3/21/2024)    Overall Financial Resource Strain (CARDIA)     Difficulty of Paying Living Expenses: Not hard at all   Food Insecurity: No Food Insecurity (3/21/2024)     Hunger Vital Sign     Worried About Running Out of Food in the Last Year: Never true     Ran Out of Food in the Last Year: Never true   Transportation Needs: No Transportation Needs (3/21/2024)    PRAPARE - Transportation     Lack of Transportation (Medical): No     Lack of Transportation (Non-Medical): No   Physical Activity: Insufficiently Active (3/21/2024)    Exercise Vital Sign     Days of Exercise per Week: 2 days     Minutes of Exercise per Session: 30 min   Stress: No Stress Concern Present (3/21/2024)    [employer] - Occupational Stress Questionnaire     Feeling of Stress : Only a little   Housing Stability: High Risk (3/21/2024)    Housing Stability Vital Sign     Unable to Pay for Housing in the Last Year: Yes     Unstable Housing in the Last Year: No     Past Surgical History:   Procedure Laterality Date    COLONOSCOPY N/A 09/17/2021    Procedure: COLONOSCOPY;  Surgeon: Jesse Ambrosio MD;  Location: [facility];  Service: Endoscopy;  Laterality: N/A;    COLONOSCOPY N/A 03/07/2023    Procedure: COLONOSCOPY;  Surgeon: Jorge A Pereyra III, MD;  Location: [facility];  Service: Endoscopy;  Laterality: N/A;    COLONOSCOPY W/ POLYPECTOMY  03/07/2023    EPIDURAL STEROID INJECTION INTO CERVICAL SPINE  06/10/2024    ESOPHAGOGASTRODUODENOSCOPY N/A 07/16/2021    Procedure: EGD (ESOPHAGOGASTRODUODENOSCOPY);  Surgeon: Jesse Ambrosio MD;  Location: [facility];  Service: Endoscopy;  Laterality: N/A;    KNEE ARTHROSCOPY Right 1990    TOTAL REPLACEMENT OF CERVICAL INTERVERTEBRAL DISC N/A 08/21/2023     Family History   Problem Relation Name Age of Onset    No Known Problems Mother      Cancer Father Ricardo Bee     Early death Father Ricardo Bee        Review of patient's allergies indicates:   Allergen Reactions    Aspirin Nausea And Vomiting     Gastric ulcers       Current Outpatient Medications:     carisoprodoL (SOMA) 350 MG tablet, Take 1 tablet (350 mg total) by mouth 3 (three)  times daily as needed for Muscle spasms., Disp: 90 tablet, Rfl: 0    ondansetron (ZOFRAN-ODT) 4 MG TbDL, Take 1 tablet (4 mg total) by mouth every 6 (six) hours as needed (nausea)., Disp: 28 tablet, Rfl: 1    temazepam (RESTORIL) 15 mg Cap, Take by mouth., Disp: , Rfl:     lisinopriL 10 MG tablet, Take 1 tablet (10 mg total) by mouth daily as needed (SBP>145/95)., Disp: 90 tablet, Rfl: 3    oxyCODONE-acetaminophen (PERCOCET)  mg per tablet, Take 1 tablet by mouth every 6 (six) hours as needed for Pain., Disp: 120 tablet, Rfl: 0    pantoprazole (PROTONIX) 40 MG tablet, Take 1 tablet (40 mg total) by mouth once daily., Disp: 90 tablet, Rfl: 3    Review of Systems   Constitutional:  Negative for activity change, appetite change, fatigue, fever and unexpected weight change.   HENT:  Negative for hearing loss, rhinorrhea and trouble swallowing.    Eyes:  Negative for discharge and visual disturbance.   Respiratory:  Negative for cough, chest tightness, shortness of breath and wheezing.    Cardiovascular:  Negative for chest pain, palpitations and leg swelling.   Gastrointestinal:  Negative for abdominal pain, blood in stool, constipation, diarrhea, nausea and vomiting.        -heartburn   Endocrine: Negative for polydipsia and polyuria.   Genitourinary:  Negative for decreased urine volume, difficulty urinating, dysuria, frequency, hematuria and urgency.   Musculoskeletal:  Positive for back pain and neck pain. Negative for arthralgias, joint swelling and myalgias.   Skin:  Negative for rash.   Neurological:  Negative for dizziness, weakness, numbness and headaches.   Hematological:  Does not bruise/bleed easily.   Psychiatric/Behavioral:  Negative for confusion, decreased concentration, dysphoric mood, sleep disturbance and suicidal ideas. The patient is not nervous/anxious.    All other systems reviewed and are negative.         Objective:      Vitals:    09/25/24 1538 09/25/24 1552   BP: 138/80 124/76   Pulse:  "76    Weight: 54 kg (119 lb)    Height: 6' 2" (1.88 m)            Wt Readings from Last 3 Encounters:   09/25/24 54 kg (119 lb)   09/13/24 63.5 kg (140 lb)   06/24/24 56.2 kg (124 lb)         Physical Exam  Vitals reviewed.   Constitutional:       General: He is not in acute distress.     Appearance: Normal appearance. He is well-developed.   HENT:      Head: Normocephalic and atraumatic.   Neck:      Thyroid: No thyromegaly.   Cardiovascular:      Rate and Rhythm: Normal rate and regular rhythm.      Heart sounds: Normal heart sounds. No murmur heard.     No friction rub.   Pulmonary:      Effort: Pulmonary effort is normal.      Breath sounds: Normal breath sounds. No wheezing or rales.   Abdominal:      General: Bowel sounds are normal. There is no distension.      Palpations: Abdomen is soft.      Tenderness: There is no abdominal tenderness.   Musculoskeletal:      Cervical back: Neck supple.   Lymphadenopathy:      Cervical: No cervical adenopathy.   Skin:     General: Skin is warm and dry.      Findings: No rash.   Neurological:      Mental Status: He is alert and oriented to person, place, and time.   Psychiatric:         Attention and Perception: He is attentive.         Speech: Speech normal.         Behavior: Behavior normal.         Thought Content: Thought content normal.         Judgment: Judgment normal.           Assessment:       1. Primary hypertension    2. Chronic neck pain    3. Gastroesophageal reflux disease, unspecified whether esophagitis present    4. Long-term use of high-risk medication    5. Encounter for therapeutic drug monitoring    6. Encounter for long-term (current) use of other medications                 Plan:       Primary hypertension  Comments:  Controlled. Will continue to monitor BP on current medication regimen  Orders:  -     lisinopriL 10 MG tablet; Take 1 tablet (10 mg total) by mouth daily as needed (SBP>145/95).  Dispense: 90 tablet; Refill: 3    Chronic neck " pain  Comments:  Pain controlled. Will continue to monitor pain control and function on current regimen. To continue to use TENS, heat, brace.  Orders:  -     oxyCODONE-acetaminophen (PERCOCET)  mg per tablet; Take 1 tablet by mouth every 6 (six) hours as needed for Pain.  Dispense: 120 tablet; Refill: 0    Gastroesophageal reflux disease, unspecified whether esophagitis present  Comments:  Controlled. Will continue to monitor symptoms. To follow with GI for EGD soon.  Orders:  -     pantoprazole (PROTONIX) 40 MG tablet; Take 1 tablet (40 mg total) by mouth once daily.  Dispense: 90 tablet; Refill: 3    Long-term use of high-risk medication  -     TSH w/reflex to FT4; Future; Expected date: 09/25/2024  -     Urinalysis, Reflex to Urine Culture Urine, Clean Catch; Future; Expected date: 09/25/2024  -     CBC Auto Differential; Future; Expected date: 09/25/2024  -     Lipid Panel; Future; Expected date: 09/25/2024  -     Comprehensive Metabolic Panel; Future; Expected date: 09/25/2024    Encounter for therapeutic drug monitoring  -     DRUG MONITOR, PANEL 4, W/CONF, URINE; Future; Expected date: 09/25/2024    Encounter for long-term (current) use of other medications  -     DRUG MONITOR, PANEL 4, W/CONF, URINE; Future; Expected date: 09/25/2024            Labs and/or tests have been ordered for the evaluation/monitoring of acute/chronic conditions, to be done just before next visit.    Chronic Pain Notes:  Have discussed the risks and benefits of chronic narcotic therapy including pain control, dependence, and addiction.  The patient is aware and accepts the risks and benefits.  Patient is aware of the risk of taking narcotics combined with benzodiazepines/muscle relaxers/hypnotics, including but not limited to breathing difficulties, coma, and death; accepts the risks; and agrees to use medications only as prescribed.      Follow up in about 3 months (around 12/25/2024) for Arthritis, GERD.        9/25/2024  Sherri Snow

## 2024-09-25 ENCOUNTER — OFFICE VISIT (OUTPATIENT)
Dept: FAMILY MEDICINE | Facility: CLINIC | Age: 64
End: 2024-09-25
Payer: MEDICARE

## 2024-09-25 ENCOUNTER — TELEPHONE (OUTPATIENT)
Dept: FAMILY MEDICINE | Facility: CLINIC | Age: 64
End: 2024-09-25

## 2024-09-25 VITALS
SYSTOLIC BLOOD PRESSURE: 124 MMHG | HEIGHT: 74 IN | BODY MASS INDEX: 15.27 KG/M2 | WEIGHT: 119 LBS | DIASTOLIC BLOOD PRESSURE: 76 MMHG | HEART RATE: 76 BPM

## 2024-09-25 DIAGNOSIS — G89.29 CHRONIC NECK PAIN: ICD-10-CM

## 2024-09-25 DIAGNOSIS — Z79.899 ENCOUNTER FOR LONG-TERM (CURRENT) USE OF OTHER MEDICATIONS: ICD-10-CM

## 2024-09-25 DIAGNOSIS — M54.2 CHRONIC NECK PAIN: ICD-10-CM

## 2024-09-25 DIAGNOSIS — Z51.81 ENCOUNTER FOR THERAPEUTIC DRUG MONITORING: ICD-10-CM

## 2024-09-25 DIAGNOSIS — Z79.899 LONG-TERM USE OF HIGH-RISK MEDICATION: ICD-10-CM

## 2024-09-25 DIAGNOSIS — K21.9 GASTROESOPHAGEAL REFLUX DISEASE, UNSPECIFIED WHETHER ESOPHAGITIS PRESENT: ICD-10-CM

## 2024-09-25 DIAGNOSIS — I10 PRIMARY HYPERTENSION: Primary | ICD-10-CM

## 2024-09-25 RX ORDER — PANTOPRAZOLE SODIUM 40 MG/1
40 TABLET, DELAYED RELEASE ORAL DAILY
Qty: 90 TABLET | Refills: 3 | Status: SHIPPED | OUTPATIENT
Start: 2024-09-25 | End: 2025-09-25

## 2024-09-25 RX ORDER — LISINOPRIL 10 MG/1
10 TABLET ORAL DAILY PRN
Qty: 90 TABLET | Refills: 3 | Status: SHIPPED | OUTPATIENT
Start: 2024-09-25

## 2024-09-25 RX ORDER — OXYCODONE AND ACETAMINOPHEN 10; 325 MG/1; MG/1
1 TABLET ORAL EVERY 6 HOURS PRN
Qty: 120 TABLET | Refills: 0 | Status: SHIPPED | OUTPATIENT
Start: 2024-09-25

## 2024-09-25 NOTE — TELEPHONE ENCOUNTER
----- Message from Beth Haider sent at 9/25/2024  4:27 PM CDT -----  Pt was seen today and needs a 3 month f/u. He would like the week before [place].  [phone]

## 2024-10-10 DIAGNOSIS — M62.838 MUSCLE SPASM: ICD-10-CM

## 2024-10-10 RX ORDER — CARISOPRODOL 350 MG/1
350 TABLET ORAL 3 TIMES DAILY PRN
Qty: 90 TABLET | Refills: 0 | Status: SHIPPED | OUTPATIENT
Start: 2024-10-10

## 2024-10-10 NOTE — TELEPHONE ENCOUNTER
The patient's prescription has been approved and sent to   [facility]  [address]  [place] [address]  Phone: [phone] Fax: [phone]

## 2024-10-22 DIAGNOSIS — G89.29 CHRONIC NECK PAIN: ICD-10-CM

## 2024-10-22 DIAGNOSIS — M54.2 CHRONIC NECK PAIN: ICD-10-CM

## 2024-10-22 RX ORDER — OXYCODONE AND ACETAMINOPHEN 10; 325 MG/1; MG/1
1 TABLET ORAL EVERY 6 HOURS PRN
Qty: 120 TABLET | Refills: 0 | Status: SHIPPED | OUTPATIENT
Start: 2024-10-24

## 2024-10-22 NOTE — TELEPHONE ENCOUNTER
The patient's prescription has been approved and sent to   [facility]  [address]  [place] [address]  Phone: [phone] Fax: [phone]

## 2024-11-07 DIAGNOSIS — M62.838 MUSCLE SPASM: ICD-10-CM

## 2024-11-07 RX ORDER — CARISOPRODOL 350 MG/1
350 TABLET ORAL 3 TIMES DAILY PRN
Qty: 90 TABLET | Refills: 0 | Status: SHIPPED | OUTPATIENT
Start: 2024-11-07

## 2024-11-07 NOTE — TELEPHONE ENCOUNTER
Last appointment : 9/25/24  Upcoming appointment: 12/16/24  Labs: Due  Last Sent in:  10/10/24   : 10/11/24

## 2024-11-07 NOTE — TELEPHONE ENCOUNTER
The patient's prescription has been approved and sent to   [facility]  [address]  [place] [address]  Phone: [phone] Fax: [phone]

## 2024-11-20 DIAGNOSIS — G89.29 CHRONIC NECK PAIN: ICD-10-CM

## 2024-11-20 DIAGNOSIS — M54.2 CHRONIC NECK PAIN: ICD-10-CM

## 2024-11-20 RX ORDER — OXYCODONE AND ACETAMINOPHEN 10; 325 MG/1; MG/1
1 TABLET ORAL EVERY 6 HOURS PRN
Qty: 120 TABLET | Refills: 0 | Status: SHIPPED | OUTPATIENT
Start: 2024-11-20

## 2024-11-21 NOTE — TELEPHONE ENCOUNTER
The patient's prescription has been approved and sent to   [facility]  [address]  [place] [address]  Phone: [phone] Fax: [phone]

## 2024-12-04 DIAGNOSIS — M62.838 MUSCLE SPASM: ICD-10-CM

## 2024-12-04 RX ORDER — CARISOPRODOL 350 MG/1
350 TABLET ORAL 3 TIMES DAILY PRN
Qty: 90 TABLET | Refills: 0 | Status: SHIPPED | OUTPATIENT
Start: 2024-12-07

## 2024-12-04 NOTE — TELEPHONE ENCOUNTER
The patient's prescription has been approved and sent to   [facility]  [address]  [place] [address]  Phone: [phone] Fax: [phone]

## 2024-12-09 ENCOUNTER — TELEPHONE (OUTPATIENT)
Dept: FAMILY MEDICINE | Facility: CLINIC | Age: 64
End: 2024-12-09
Payer: MEDICARE

## 2024-12-09 NOTE — TELEPHONE ENCOUNTER
----- Message from Ninfa sent at 12/9/2024  7:39 AM CST -----  - 12/6-12:24 pm- pt is calling about refills   [phone]

## 2024-12-15 NOTE — PROGRESS NOTES
SUBJECTIVE:    Patient ID: Beka Bee is a 64 y.o. male.    Chief Complaint: Follow-up (3 mo f/u//no med bottles//complains of neck pain and insomnia//would like referral to gastroenterologist that takes [facility] medicare//tc)    Pt here to checkup on acute and chronic conditions.    PMHx of cervical DDD, GERD, ADD.    His neck continues to bother him.   (S/p  ROAMN). He wants to burn some nerves that he didn't have don because he got sick ([place]). Takes pain med, which he is prescribed to take every 8 hours. States his soma does help with spasms.   Uses his tens at night with a heating pad. Still sleeping with his neck brace at night. Pain level is 8/10. Took his medication today.     Worked as a , but has not worked since January 2024.  NS (Summers) did surgery 8/21/2023, total replacement of cervical intervertebral disk. (Currently on Disability at work). States she wants to do another surgery on her neck, but she would like to see what procedures with Dr. Deras can do for him first.   Has chronic back pain. Has left sciatic back pain.  (Failed ibuprofen, naproxen)  (failed tizanidine, robaxin, flexeril)  Soma and valium has helped.       BP is slightly elevated today. Took medication today. Denies CP/SOB/HA.  Has not been getting dizzy since he has been taking the lisinopril. Weight is stable.     Not sleeping the best at night.  His psychiatrist gives him something at night.   Has a medication, trazodone to take at night, but doesn't like the groggy feeling it leaves you with in the morning. Ambien didn't help put him to sleep. Takes temazepam at night, because it is hard for him to get up in the morning.     Has been having problems with his stomach for the last 3 months. His current GI no longer takes his insurance.  Still taking protonix and zofran.   Had an ulcer 2 years ago.   Has taken carafate in the past that also helped some. Needs a new doctor.Has still been having some  occasional nausea.      Has not had labs done.      ------------------------------------------------------------------------  Cscope 3/2023  Has had his flu shot and COVID shot.  Takes Men's [facility] MVI        No visits with results within 6 Month(s) from this visit.   Latest known visit with results is:   Office Visit on 12/14/2023   Component Date Value Ref Range Status    Amphetamines 12/18/2023 POSITIVE (A)  <500 ng/mL Final    Amphetamine 12/18/2023 7,216 (H)  <250 ng/mL Final    Methamphetamine 12/18/2023 NEGATIVE  <250 ng/mL Final    Amphetamines Comments 12/18/2023    Final    Barbiturates 12/18/2023 NEGATIVE  <300 ng/mL Final    Benzodiazepines 12/18/2023 NEGATIVE  <100 ng/mL Final    Cocaine Metabolites 12/18/2023 NEGATIVE  <150 ng/mL Final    Methadone 12/18/2023 NEGATIVE  <100 ng/mL Final    Opiates 12/18/2023 NEGATIVE CONFIRMED  <100 ng/mL Final    Codeine 12/18/2023 NEGATIVE  <50 ng/mL Final    Hydrocodone 12/18/2023 NEGATIVE  <50 ng/mL Final    Hydromorphone 12/18/2023 NEGATIVE  <50 ng/mL Final    Morphine 12/18/2023 NEGATIVE  <50 ng/mL Final    NORHYDROCODONE 12/18/2023 NEGATIVE  <50 ng/mL Final    Opiates Comments 12/18/2023    Final    Oxycodone 12/18/2023 POSITIVE (A)  <100 ng/mL Final    NOROXYCODONE 12/18/2023 >30681 (H)  <50 ng/mL Final    Oxycodone 12/18/2023 4,896 (H)  <50 ng/mL Final    Oxymorphone by GC/MS 12/18/2023 2,628 (H)  <50 ng/mL Final    Oxycodone Comments 12/18/2023    Final    Phencyclidine 12/18/2023 NEGATIVE  <25 ng/mL Final    Creatinine 12/18/2023 69.1  > or = 20.0 mg/dL Final    pH 12/18/2023 6.0  4.5 - 9.0 Final    Oxidants, Urine (Tox) 12/18/2023 NEGATIVE  <200 mcg/mL Final    Notes and Comments 12/18/2023    Final       Past Medical History:   Diagnosis Date    ADHD (attention deficit hyperactivity disorder)     Depression     Digestive disorder     colon polyps    PUD (peptic ulcer disease) 1990    blood transfusion     Social History     Socioeconomic History    Marital  status: Single   Tobacco Use    Smoking status: Never     Passive exposure: Yes    Smokeless tobacco: Never    Tobacco comments:     Vaping   Substance and Sexual Activity    Alcohol use: Not Currently     Comment: No drinks    Drug use: Yes    Sexual activity: Not Currently     Partners: Female     Birth control/protection: Condom   Social History Narrative    ** Merged History Encounter **          Social Drivers of Health     Financial Resource Strain: Low Risk  (3/21/2024)    Overall Financial Resource Strain (CARDIA)     Difficulty of Paying Living Expenses: Not hard at all   Food Insecurity: No Food Insecurity (3/21/2024)    Hunger Vital Sign     Worried About Running Out of Food in the Last Year: Never true     Ran Out of Food in the Last Year: Never true   Transportation Needs: No Transportation Needs (3/21/2024)    PRAPARE - Transportation     Lack of Transportation (Medical): No     Lack of Transportation (Non-Medical): No   Physical Activity: Insufficiently Active (3/21/2024)    Exercise Vital Sign     Days of Exercise per Week: 2 days     Minutes of Exercise per Session: 30 min   Stress: No Stress Concern Present (3/21/2024)    [employer] [place] [employer] - Occupational Stress Questionnaire     Feeling of Stress : Only a little   Housing Stability: High Risk (3/21/2024)    Housing Stability Vital Sign     Unable to Pay for Housing in the Last Year: Yes     Unstable Housing in the Last Year: No     Past Surgical History:   Procedure Laterality Date    COLONOSCOPY N/A 09/17/2021    Procedure: COLONOSCOPY;  Surgeon: Jesse Ambrosio MD;  Location: [facility];  Service: Endoscopy;  Laterality: N/A;    COLONOSCOPY N/A 03/07/2023    Procedure: COLONOSCOPY;  Surgeon: Jorge A Pereyra III, MD;  Location: [facility];  Service: Endoscopy;  Laterality: N/A;    COLONOSCOPY W/ POLYPECTOMY  03/07/2023    EPIDURAL STEROID INJECTION INTO CERVICAL SPINE  06/10/2024    ESOPHAGOGASTRODUODENOSCOPY N/A  07/16/2021    Procedure: EGD (ESOPHAGOGASTRODUODENOSCOPY);  Surgeon: Jesse Ambrosio MD;  Location: [facility];  Service: Endoscopy;  Laterality: N/A;    KNEE ARTHROSCOPY Right 1990    TOTAL REPLACEMENT OF CERVICAL INTERVERTEBRAL DISC N/A 08/21/2023     Family History   Problem Relation Name Age of Onset    No Known Problems Mother      Cancer Father Ricardo Bee     Early death Father Ricardo Bee        Review of patient's allergies indicates:   Allergen Reactions    Aspirin Nausea And Vomiting     Gastric ulcers       Current Outpatient Medications:     carisoprodoL (SOMA) 350 MG tablet, Take 1 tablet (350 mg total) by mouth 3 (three) times daily as needed for Muscle spasms., Disp: 90 tablet, Rfl: 0    pantoprazole (PROTONIX) 40 MG tablet, Take 1 tablet (40 mg total) by mouth once daily., Disp: 90 tablet, Rfl: 3    lisinopriL (PRINIVIL,ZESTRIL) 20 MG tablet, Take 1 tablet (20 mg total) by mouth once daily., Disp: 90 tablet, Rfl: 3    ondansetron (ZOFRAN-ODT) 4 MG TbDL, Take 1 tablet (4 mg total) by mouth every 6 (six) hours as needed (nausea)., Disp: 28 tablet, Rfl: 1    [START ON 12/20/2024] oxyCODONE-acetaminophen (PERCOCET)  mg per tablet, Take 1 tablet by mouth every 6 (six) hours as needed for Pain., Disp: 120 tablet, Rfl: 0    sucralfate (CARAFATE) 1 gram tablet, Take 1 tablet (1 g total) by mouth 4 (four) times daily before meals and nightly., Disp: 60 tablet, Rfl: 1    Review of Systems   Constitutional:  Negative for activity change, appetite change, fatigue, fever and unexpected weight change.   HENT:  Negative for hearing loss, rhinorrhea and trouble swallowing.    Eyes:  Negative for discharge and visual disturbance.   Respiratory:  Negative for cough, chest tightness, shortness of breath and wheezing.    Cardiovascular:  Negative for chest pain, palpitations and leg swelling.   Gastrointestinal:  Negative for abdominal pain, blood in stool, constipation, diarrhea, nausea and vomiting.         "-heartburn   Endocrine: Negative for polydipsia and polyuria.   Genitourinary:  Negative for decreased urine volume, difficulty urinating, dysuria, frequency, hematuria and urgency.   Musculoskeletal:  Positive for back pain and neck pain. Negative for arthralgias, joint swelling and myalgias.   Skin:  Negative for rash.   Neurological:  Negative for dizziness, weakness, numbness and headaches.   Hematological:  Does not bruise/bleed easily.   Psychiatric/Behavioral:  Negative for confusion, decreased concentration, dysphoric mood, sleep disturbance and suicidal ideas. The patient is not nervous/anxious.    All other systems reviewed and are negative.         Objective:      Vitals:    12/16/24 1518   BP: (!) 148/80   Pulse: 80   Weight: 55.3 kg (122 lb)   Height: 6' 2" (1.88 m)             Wt Readings from Last 3 Encounters:   12/16/24 55.3 kg (122 lb)   09/25/24 54 kg (119 lb)   09/13/24 63.5 kg (140 lb)         Physical Exam  Vitals reviewed.   Constitutional:       General: He is not in acute distress.     Appearance: Normal appearance. He is well-developed.   HENT:      Head: Normocephalic and atraumatic.   Neck:      Thyroid: No thyromegaly.   Cardiovascular:      Rate and Rhythm: Normal rate and regular rhythm.      Heart sounds: Normal heart sounds. No murmur heard.     No friction rub.   Pulmonary:      Effort: Pulmonary effort is normal.      Breath sounds: Normal breath sounds. No wheezing or rales.   Abdominal:      General: Bowel sounds are normal. There is no distension.      Palpations: Abdomen is soft.      Tenderness: There is no abdominal tenderness.   Musculoskeletal:      Cervical back: Neck supple.   Lymphadenopathy:      Cervical: No cervical adenopathy.   Skin:     General: Skin is warm and dry.      Findings: No rash.   Neurological:      Mental Status: He is alert and oriented to person, place, and time.   Psychiatric:         Attention and Perception: He is attentive.         Speech: Speech " normal.         Behavior: Behavior normal.         Thought Content: Thought content normal.         Judgment: Judgment normal.           Assessment:       1. Primary hypertension    2. Chronic neck pain    3. Gastroesophageal reflux disease, unspecified whether esophagitis present                   Plan:       Primary hypertension  Comments:  Uncontrolled. To increase lisinopril to 2 tabs daily. Will continue to monitor symptom and BP on current regimen  Orders:  -     lisinopriL (PRINIVIL,ZESTRIL) 20 MG tablet; Take 1 tablet (20 mg total) by mouth once daily.  Dispense: 90 tablet; Refill: 3    Chronic neck pain  Comments:  Pain controlled. Will continue to monitor pain control and function on current regimen. To continue to use TENS, heat, brace.  Orders:  -     oxyCODONE-acetaminophen (PERCOCET)  mg per tablet; Take 1 tablet by mouth every 6 (six) hours as needed for Pain.  Dispense: 120 tablet; Refill: 0    Gastroesophageal reflux disease, unspecified whether esophagitis present  Comments:  Chronic. Hx ulcer. Will refer to GI as likely needs to be scoped. Will try on carafate for now.  Orders:  -     ondansetron (ZOFRAN-ODT) 4 MG TbDL; Take 1 tablet (4 mg total) by mouth every 6 (six) hours as needed (nausea).  Dispense: 28 tablet; Refill: 1  -     Ambulatory referral/consult to Gastroenterology; Future; Expected date: 12/23/2024  -     sucralfate (CARAFATE) 1 gram tablet; Take 1 tablet (1 g total) by mouth 4 (four) times daily before meals and nightly.  Dispense: 60 tablet; Refill: 1        Labs and/or tests have been ordered for the evaluation/monitoring of acute/chronic conditions, to be done just before next visit.    Chronic Pain Notes:  Have discussed the risks and benefits of chronic narcotic therapy including pain control, dependence, and addiction.  The patient is aware and accepts the risks and benefits.  Patient is aware of the risk of taking narcotics combined with benzodiazepines/muscle  relaxers/hypnotics, including but not limited to breathing difficulties, coma, and death; accepts the risks; and agrees to use medications only as prescribed.      Follow up in about 3 months (around 3/16/2025) for HTN, Arthritis, GERD, LABS.        12/16/2024 Sherri Snow

## 2024-12-16 ENCOUNTER — OFFICE VISIT (OUTPATIENT)
Dept: FAMILY MEDICINE | Facility: CLINIC | Age: 64
End: 2024-12-16
Payer: MEDICARE

## 2024-12-16 VITALS
HEIGHT: 74 IN | SYSTOLIC BLOOD PRESSURE: 148 MMHG | HEART RATE: 80 BPM | BODY MASS INDEX: 15.66 KG/M2 | WEIGHT: 122 LBS | DIASTOLIC BLOOD PRESSURE: 80 MMHG

## 2024-12-16 DIAGNOSIS — I10 PRIMARY HYPERTENSION: Primary | ICD-10-CM

## 2024-12-16 DIAGNOSIS — G89.29 CHRONIC NECK PAIN: ICD-10-CM

## 2024-12-16 DIAGNOSIS — M54.2 CHRONIC NECK PAIN: ICD-10-CM

## 2024-12-16 DIAGNOSIS — K21.9 GASTROESOPHAGEAL REFLUX DISEASE, UNSPECIFIED WHETHER ESOPHAGITIS PRESENT: ICD-10-CM

## 2024-12-16 PROCEDURE — 4010F ACE/ARB THERAPY RXD/TAKEN: CPT | Mod: CPTII,S$GLB,, | Performed by: FAMILY MEDICINE

## 2024-12-16 PROCEDURE — 3079F DIAST BP 80-89 MM HG: CPT | Mod: CPTII,S$GLB,, | Performed by: FAMILY MEDICINE

## 2024-12-16 PROCEDURE — 1101F PT FALLS ASSESS-DOCD LE1/YR: CPT | Mod: CPTII,S$GLB,, | Performed by: FAMILY MEDICINE

## 2024-12-16 PROCEDURE — 3288F FALL RISK ASSESSMENT DOCD: CPT | Mod: CPTII,S$GLB,, | Performed by: FAMILY MEDICINE

## 2024-12-16 PROCEDURE — 99214 OFFICE O/P EST MOD 30 MIN: CPT | Mod: S$GLB,,, | Performed by: FAMILY MEDICINE

## 2024-12-16 PROCEDURE — 3077F SYST BP >= 140 MM HG: CPT | Mod: CPTII,S$GLB,, | Performed by: FAMILY MEDICINE

## 2024-12-16 PROCEDURE — 3008F BODY MASS INDEX DOCD: CPT | Mod: CPTII,S$GLB,, | Performed by: FAMILY MEDICINE

## 2024-12-16 PROCEDURE — 1160F RVW MEDS BY RX/DR IN RCRD: CPT | Mod: CPTII,S$GLB,, | Performed by: FAMILY MEDICINE

## 2024-12-16 PROCEDURE — 1159F MED LIST DOCD IN RCRD: CPT | Mod: CPTII,S$GLB,, | Performed by: FAMILY MEDICINE

## 2024-12-16 RX ORDER — SUCRALFATE 1 G/1
1 TABLET ORAL
Qty: 60 TABLET | Refills: 1 | Status: SHIPPED | OUTPATIENT
Start: 2024-12-16

## 2024-12-16 RX ORDER — ONDANSETRON 4 MG/1
4 TABLET, ORALLY DISINTEGRATING ORAL EVERY 6 HOURS PRN
Qty: 28 TABLET | Refills: 1 | Status: SHIPPED | OUTPATIENT
Start: 2024-12-16

## 2024-12-16 RX ORDER — LISINOPRIL 20 MG/1
20 TABLET ORAL DAILY
Qty: 90 TABLET | Refills: 3 | Status: SHIPPED | OUTPATIENT
Start: 2024-12-16

## 2024-12-16 RX ORDER — OXYCODONE AND ACETAMINOPHEN 10; 325 MG/1; MG/1
1 TABLET ORAL EVERY 6 HOURS PRN
Qty: 120 TABLET | Refills: 0 | Status: SHIPPED | OUTPATIENT
Start: 2024-12-20

## 2025-01-02 ENCOUNTER — TELEPHONE (OUTPATIENT)
Dept: FAMILY MEDICINE | Facility: CLINIC | Age: 65
End: 2025-01-02
Payer: MEDICARE

## 2025-01-05 DIAGNOSIS — M62.838 MUSCLE SPASM: ICD-10-CM

## 2025-01-06 RX ORDER — CARISOPRODOL 350 MG/1
350 TABLET ORAL 3 TIMES DAILY PRN
Qty: 90 TABLET | Refills: 0 | Status: SHIPPED | OUTPATIENT
Start: 2025-01-08

## 2025-01-06 NOTE — TELEPHONE ENCOUNTER
The patient's prescription has been approved and sent to   [facility]  [address]  [place] [address]  Phone: [phone] Fax: [phone]

## 2025-01-16 DIAGNOSIS — M54.2 CHRONIC NECK PAIN: ICD-10-CM

## 2025-01-16 DIAGNOSIS — G89.29 CHRONIC NECK PAIN: ICD-10-CM

## 2025-01-16 RX ORDER — OXYCODONE AND ACETAMINOPHEN 10; 325 MG/1; MG/1
1 TABLET ORAL EVERY 6 HOURS PRN
Qty: 120 TABLET | Refills: 0 | Status: SHIPPED | OUTPATIENT
Start: 2025-01-19 | End: 2025-01-17 | Stop reason: SDUPTHER

## 2025-01-16 NOTE — TELEPHONE ENCOUNTER
The patient's prescription has been approved and sent to   [facility]  [address]  [place] [address]  Phone: [phone] Fax: [phone]

## 2025-01-17 DIAGNOSIS — G89.29 CHRONIC NECK PAIN: ICD-10-CM

## 2025-01-17 DIAGNOSIS — M54.2 CHRONIC NECK PAIN: ICD-10-CM

## 2025-01-17 RX ORDER — OXYCODONE AND ACETAMINOPHEN 10; 325 MG/1; MG/1
1 TABLET ORAL EVERY 6 HOURS PRN
Qty: 120 TABLET | Refills: 0 | Status: SHIPPED | OUTPATIENT
Start: 2025-01-17

## 2025-01-17 NOTE — TELEPHONE ENCOUNTER
----- Message from Zoey sent at 1/17/2025 11:19 AM CST -----  Refill Oxycodone [facility] in [facility]. They are closed on the weekend and Monday. Pt's # [phone]

## 2025-01-17 NOTE — TELEPHONE ENCOUNTER
The patient's prescription has been approved and sent to   [facility]  [address]  [place] [address]  Phone: [phone] Fax: [phone]

## 2025-02-05 DIAGNOSIS — K21.9 GASTROESOPHAGEAL REFLUX DISEASE, UNSPECIFIED WHETHER ESOPHAGITIS PRESENT: ICD-10-CM

## 2025-02-05 DIAGNOSIS — M62.838 MUSCLE SPASM: ICD-10-CM

## 2025-02-05 RX ORDER — ONDANSETRON 4 MG/1
4 TABLET, ORALLY DISINTEGRATING ORAL EVERY 6 HOURS PRN
Qty: 28 TABLET | Refills: 1 | OUTPATIENT
Start: 2025-02-05

## 2025-02-05 RX ORDER — CARISOPRODOL 350 MG/1
350 TABLET ORAL 3 TIMES DAILY PRN
Qty: 90 TABLET | Refills: 0 | OUTPATIENT
Start: 2025-02-05

## 2025-02-06 DIAGNOSIS — M62.838 MUSCLE SPASM: ICD-10-CM

## 2025-02-06 DIAGNOSIS — K21.9 GASTROESOPHAGEAL REFLUX DISEASE, UNSPECIFIED WHETHER ESOPHAGITIS PRESENT: ICD-10-CM

## 2025-02-06 RX ORDER — ONDANSETRON 4 MG/1
4 TABLET, ORALLY DISINTEGRATING ORAL EVERY 6 HOURS PRN
Qty: 28 TABLET | Refills: 1 | OUTPATIENT
Start: 2025-02-06

## 2025-02-06 RX ORDER — CARISOPRODOL 350 MG/1
350 TABLET ORAL 3 TIMES DAILY PRN
Qty: 90 TABLET | Refills: 0 | OUTPATIENT
Start: 2025-02-06

## 2025-02-06 RX ORDER — CARISOPRODOL 350 MG/1
350 TABLET ORAL 3 TIMES DAILY PRN
Qty: 90 TABLET | Refills: 0 | Status: CANCELLED | OUTPATIENT
Start: 2025-02-06

## 2025-02-07 DIAGNOSIS — M62.838 MUSCLE SPASM: ICD-10-CM

## 2025-02-07 DIAGNOSIS — G89.29 CHRONIC NECK PAIN: ICD-10-CM

## 2025-02-07 DIAGNOSIS — M54.2 CHRONIC NECK PAIN: ICD-10-CM

## 2025-02-07 DIAGNOSIS — K21.9 GASTROESOPHAGEAL REFLUX DISEASE, UNSPECIFIED WHETHER ESOPHAGITIS PRESENT: ICD-10-CM

## 2025-02-07 RX ORDER — ONDANSETRON 4 MG/1
4 TABLET, ORALLY DISINTEGRATING ORAL EVERY 6 HOURS PRN
Qty: 30 TABLET | Refills: 1 | Status: SHIPPED | OUTPATIENT
Start: 2025-02-07

## 2025-02-07 RX ORDER — OXYCODONE AND ACETAMINOPHEN 10; 325 MG/1; MG/1
1 TABLET ORAL EVERY 6 HOURS PRN
Qty: 120 TABLET | Refills: 0 | Status: CANCELLED | OUTPATIENT
Start: 2025-02-07

## 2025-02-07 RX ORDER — ONDANSETRON 4 MG/1
4 TABLET, ORALLY DISINTEGRATING ORAL EVERY 6 HOURS PRN
Qty: 28 TABLET | Refills: 1 | OUTPATIENT
Start: 2025-02-07

## 2025-02-07 RX ORDER — CARISOPRODOL 350 MG/1
350 TABLET ORAL 3 TIMES DAILY PRN
Qty: 90 TABLET | Refills: 0 | Status: SHIPPED | OUTPATIENT
Start: 2025-02-07

## 2025-02-07 RX ORDER — CARISOPRODOL 350 MG/1
350 TABLET ORAL 3 TIMES DAILY PRN
Qty: 90 TABLET | Refills: 0 | OUTPATIENT
Start: 2025-02-07

## 2025-02-07 NOTE — TELEPHONE ENCOUNTER
----- Message from Ninfa sent at 2/7/2025  8:48 AM CST -----  Pt is calling for refill on muscle relaxer and nausea medication   [facility]   [phone]

## 2025-02-14 DIAGNOSIS — G89.29 CHRONIC NECK PAIN: ICD-10-CM

## 2025-02-14 DIAGNOSIS — M54.2 CHRONIC NECK PAIN: ICD-10-CM

## 2025-02-14 RX ORDER — OXYCODONE AND ACETAMINOPHEN 10; 325 MG/1; MG/1
1 TABLET ORAL EVERY 6 HOURS PRN
Qty: 120 TABLET | Refills: 0 | Status: SHIPPED | OUTPATIENT
Start: 2025-02-14

## 2025-02-14 NOTE — TELEPHONE ENCOUNTER
The patient's prescription has been approved and sent to   [facility]  [address]  [place] [address]  Phone: [phone] Fax: [phone]

## 2025-03-10 ENCOUNTER — PATIENT MESSAGE (OUTPATIENT)
Dept: ADMINISTRATIVE | Facility: HOSPITAL | Age: 65
End: 2025-03-10
Payer: MEDICARE

## 2025-03-13 DIAGNOSIS — M54.2 CHRONIC NECK PAIN: ICD-10-CM

## 2025-03-13 DIAGNOSIS — G89.29 CHRONIC NECK PAIN: ICD-10-CM

## 2025-03-13 RX ORDER — OXYCODONE AND ACETAMINOPHEN 10; 325 MG/1; MG/1
1 TABLET ORAL EVERY 6 HOURS PRN
Qty: 120 TABLET | Refills: 0 | Status: SHIPPED | OUTPATIENT
Start: 2025-03-14

## 2025-03-13 NOTE — TELEPHONE ENCOUNTER
The patient's prescription has been approved and sent to   [facility]  [address]  [place] [facility] [address]  Phone: [phone] Fax: [phone]

## 2025-03-17 ENCOUNTER — TELEPHONE (OUTPATIENT)
Dept: FAMILY MEDICINE | Facility: CLINIC | Age: 65
End: 2025-03-17

## 2025-03-17 ENCOUNTER — OFFICE VISIT (OUTPATIENT)
Dept: FAMILY MEDICINE | Facility: CLINIC | Age: 65
End: 2025-03-17
Payer: MEDICARE

## 2025-03-17 VITALS
HEIGHT: 74 IN | BODY MASS INDEX: 15.66 KG/M2 | OXYGEN SATURATION: 97 % | WEIGHT: 122 LBS | HEART RATE: 94 BPM | SYSTOLIC BLOOD PRESSURE: 134 MMHG | DIASTOLIC BLOOD PRESSURE: 80 MMHG

## 2025-03-17 DIAGNOSIS — Z23 PNEUMOCOCCAL VACCINATION ADMINISTERED AT CURRENT VISIT: ICD-10-CM

## 2025-03-17 DIAGNOSIS — R41.840 ATTENTION OR CONCENTRATION DEFICIT: ICD-10-CM

## 2025-03-17 DIAGNOSIS — Z79.899 LONG-TERM USE OF HIGH-RISK MEDICATION: ICD-10-CM

## 2025-03-17 DIAGNOSIS — M54.2 CHRONIC NECK PAIN: ICD-10-CM

## 2025-03-17 DIAGNOSIS — Z87.891 PERSONAL HISTORY OF SMOKING: ICD-10-CM

## 2025-03-17 DIAGNOSIS — Z51.81 ENCOUNTER FOR THERAPEUTIC DRUG MONITORING: ICD-10-CM

## 2025-03-17 DIAGNOSIS — Z72.0 SMOKING TRYING TO QUIT: ICD-10-CM

## 2025-03-17 DIAGNOSIS — K21.9 GASTROESOPHAGEAL REFLUX DISEASE, UNSPECIFIED WHETHER ESOPHAGITIS PRESENT: ICD-10-CM

## 2025-03-17 DIAGNOSIS — G89.29 CHRONIC NECK PAIN: ICD-10-CM

## 2025-03-17 DIAGNOSIS — I10 PRIMARY HYPERTENSION: Primary | ICD-10-CM

## 2025-03-17 RX ORDER — DEXTROAMPHETAMINE SACCHARATE, AMPHETAMINE ASPARTATE MONOHYDRATE, DEXTROAMPHETAMINE SULFATE AND AMPHETAMINE SULFATE 5; 5; 5; 5 MG/1; MG/1; MG/1; MG/1
20 CAPSULE, EXTENDED RELEASE ORAL EVERY MORNING
COMMUNITY
Start: 2024-12-30 | End: 2025-03-30

## 2025-03-17 NOTE — PROGRESS NOTES
SUBJECTIVE:    Patient ID: Beka Bee is a 64 y.o. male.    Chief Complaint: Follow-up (3 mo f/u//no med bottles//oral drug test ordered//tc)    HPI  History of Present Illness    CHIEF COMPLAINT:  Beka presents today for follow up of neck pain    NECK PAIN:  He recently underwent nerve block procedure with reported improvement in symptoms. He had rhizotomy performed over a year ago which provided temporary relief. He uses neck collar while driving and during sleep for symptom management. He reports associated nausea with neck pain episodes that responds to prescribed anti-nausea medication. MRI was performed last week. He experienced 8-pound weight loss following neck surgery and has been unable to regain the weight.    CURRENT MEDICATIONS:  He takes Soma at night for muscle spasms, pain medication every 6 hours, Lisinopril for blood pressure with good response without orthostatic symptoms, Pantoprazole for acid reflux, and Adderall daily in the morning.    RECENT ILLNESS:  He reports experiencing illness 2 weeks ago with initial improvement after 4-5 days followed by worsening symptoms. Total duration of illness was approximately 2 weeks, which is unusually long for him.    WEIGHT MANAGEMENT:  He reports chronic difficulty gaining weight with maximum lifetime weight of 150 lbs despite high food intake.    SOCIAL HISTORY:  He currently smokes 4-5 cigarettes per day, started at age 24, and reports previous quit attempt using nicotine patches.    FAMILY HISTORY:  Family history is significant for cancer.      ROS:  General: denies fever, denies chills, denies fatigue, denies weight gain, +weight loss  Eyes: denies vision changes, denies redness, denies discharge  ENT: denies ear pain, denies nasal congestion, denies sore throat  Cardiovascular: denies chest pain, denies palpitations, denies lower extremity edema  Respiratory: denies cough, denies shortness of breath  Gastrointestinal: denies abdominal pain,  +nausea, denies vomiting, denies diarrhea, denies constipation, denies blood in stool, +indigestion  Genitourinary: denies dysuria, denies hematuria, denies frequency  Musculoskeletal: denies joint pain, denies muscle pain, +neck pain, +muscle spasms  Skin: denies rash, denies lesion  Neurological: denies headache, denies dizziness, denies numbness, denies tingling  Psychiatric: denies anxiety, denies depression, denies sleep difficulty       Started smoking at 24 years old, smokes 1/2 ppd.   (Skaff-ADD)  Office Visit on 03/17/2025   Component Date Value Ref Range Status    Amphetamines 03/17/2025 POSITIVE (A)  <10 ng/mL Final    AMPHETAMINE 03/17/2025 235 (H)  <10 ng/mL Final    METHAMPHETAMINE 03/17/2025 Negative  <10 ng/mL Final    Barbiturates 03/17/2025 NEGATIVE  <10 ng/mL Final    Benzodiazepines 03/17/2025 NEGATIVE  <0.50 ng/mL Final    Buprenorphine 03/17/2025 NEGATIVE  <0.10 ng/mL Final    Cocaine 03/17/2025 NEGATIVE  <5.0 ng/mL Final    Fentanyl 03/17/2025 NEGATIVE  <0.10 ng/mL Final    Heroin Screen, Serum/Plasma 03/17/2025 NEGATIVE  <1.0 ng/mL Final    THC 03/17/2025 NEGATIVE  <2.5 ng/mL Final    MDMA 03/17/2025 NEGATIVE  <10 ng/mL Final    Meprobamate Lvl 03/17/2025 POSITIVE (A)  <2.5 ng/mL Final    CARISOPRODOL 03/17/2025 33.8 (H)  <2.5 ng/mL Final    Meprobamate Lvl 03/17/2025 >250.0 (H)  <2.5 ng/mL Final    Methadone 03/17/2025 NEGATIVE  <5.0 ng/mL Final    Nicotine Metabolite(s), Serum 03/17/2025 POSITIVE (A)  <5.0 ng/mL Final    Cotinine 03/17/2025 25.4 (H)  <5.0 ng/mL Final    Opiates 03/17/2025 POSITIVE (A)  <2.5 ng/mL Final    Codeine 03/17/2025 Negative  <2.5 ng/mL Final    Dihydrocodeine 03/17/2025 Negative  <2.5 ng/mL Final    Hydrocodone 03/17/2025 Negative  <2.5 ng/mL Final    HYDROMORPHONE 03/17/2025 Negative  <2.5 ng/mL Final    Morphine 03/17/2025 Negative  <2.5 ng/mL Final    NORHYDROCODONE 03/17/2025 Negative  <2.5 ng/mL Final    NOROXYCODONE 03/17/2025 68.4 (H)  <2.5 ng/mL Final     OXYCODONE 03/17/2025 >250.0 (H)  <2.5 ng/mL Final    OXYMORPHONE 03/17/2025 Negative  <2.5 ng/mL Final    Phencyclidine 03/17/2025 NEGATIVE  <10 ng/mL Final    Tapentadol 03/17/2025 NEGATIVE  <5.0 ng/mL Final    TRAMADOL 03/17/2025 NEGATIVE  <5.0 ng/mL Final    ZOLPIDEM 03/17/2025 NEGATIVE  <5.0 ng/mL Final       Past Medical History:   Diagnosis Date    ADHD (attention deficit hyperactivity disorder)     Depression     Digestive disorder     colon polyps    PUD (peptic ulcer disease) 1990    blood transfusion     Social History[1]  Past Surgical History:   Procedure Laterality Date    COLONOSCOPY N/A 09/17/2021    Procedure: COLONOSCOPY;  Surgeon: Jesse Ambrosio MD;  Location: [facility];  Service: Endoscopy;  Laterality: N/A;    COLONOSCOPY N/A 03/07/2023    Procedure: COLONOSCOPY;  Surgeon: Jorge A Pereyra III, MD;  Location: [facility];  Service: Endoscopy;  Laterality: N/A;    COLONOSCOPY W/ POLYPECTOMY  03/07/2023    EPIDURAL STEROID INJECTION INTO CERVICAL SPINE  06/10/2024    ESOPHAGOGASTRODUODENOSCOPY N/A 07/16/2021    Procedure: EGD (ESOPHAGOGASTRODUODENOSCOPY);  Surgeon: Jesse Ambrosio MD;  Location: [facility];  Service: Endoscopy;  Laterality: N/A;    KNEE ARTHROSCOPY Right 1990    TOTAL REPLACEMENT OF CERVICAL INTERVERTEBRAL DISC N/A 08/21/2023     Family History   Problem Relation Name Age of Onset    No Known Problems Mother      Cancer Father Ricardo Bee     Early death Father Ricardo Bee        Review of patient's allergies indicates:   Allergen Reactions    Aspirin Nausea And Vomiting     Gastric ulcers       Current Outpatient Medications:     carisoprodoL (SOMA) 350 MG tablet, Take 1 tablet (350 mg total) by mouth 3 (three) times daily as needed for Muscle spasms., Disp: 90 tablet, Rfl: 0    dextroamphetamine-amphetamine (ADDERALL XR) 20 MG 24 hr capsule, Take 20 mg by mouth every morning., Disp: , Rfl:     lisinopriL (PRINIVIL,ZESTRIL) 20 MG tablet, Take 1 tablet (20 mg total) by mouth  "once daily., Disp: 90 tablet, Rfl: 3    oxyCODONE-acetaminophen (PERCOCET)  mg per tablet, Take 1 tablet by mouth every 6 (six) hours as needed for Pain., Disp: 120 tablet, Rfl: 0    pantoprazole (PROTONIX) 40 MG tablet, Take 1 tablet (40 mg total) by mouth once daily., Disp: 90 tablet, Rfl: 3    sucralfate (CARAFATE) 1 gram tablet, Take 1 tablet (1 g total) by mouth 4 (four) times daily before meals and nightly., Disp: 60 tablet, Rfl: 1    ondansetron (ZOFRAN-ODT) 4 MG TbDL, Take 1 tablet (4 mg total) by mouth every 6 (six) hours as needed (nausea)., Disp: 30 tablet, Rfl: 1    Review of Systems       Objective:      Vitals:    03/17/25 1522   BP: 134/80   Pulse: 94   SpO2: 97%   Weight: 55.3 kg (122 lb)   Height: 6' 2" (1.88 m)     Physical Exam  Physical Exam    Vitals: Blood pressure: 134/80.  General: No acute distress. Well-developed. Well-nourished.  Eyes: EOMI. Sclerae anicteric.  HENT: Normocephalic. Atraumatic. Nares patent. Moist oral mucosa.  Ears: Bilateral TMs clear. Bilateral EACs clear.  Cardiovascular: Regular rate. Regular rhythm. No murmurs. No rubs. No gallops. Normal S1, S2.  Respiratory: Normal respiratory effort. Clear to auscultation bilaterally. No rales. No rhonchi. No wheezing.  Abdomen: Soft. Non-tender. Non-distended. Normoactive bowel sounds.  Musculoskeletal: No  obvious deformity.  Extremities: No lower extremity edema.  Neurological: Alert & oriented x3. No slurred speech. Normal gait.  Psychiatric: Normal mood. Normal affect. Good insight. Good judgment.  Skin: Warm. Dry. No rash.             Assessment:       1. Primary hypertension    2. Chronic neck pain    3. Gastroesophageal reflux disease, unspecified whether esophagitis present    4. Attention or concentration deficit    5. Smoking trying to quit    6. Pneumococcal vaccination administered at current visit    7. Long-term use of high-risk medication    8. Encounter for therapeutic drug monitoring    9. Personal history " of smoking         Plan:       Primary hypertension  Comments:  Controlled. Will continue to monitor BP on current medication regimen    Chronic neck pain  Comments:  Pain controlled. Will continue to monitor pain control and function. To continue to follow with PM for interventional procedures.  Orders:  -     Drug Tox Monitoring 1, W/Conf, Oral Fluid; Future; Expected date: 03/17/2025    Gastroesophageal reflux disease, unspecified whether esophagitis present  Comments:  Controlled. Will continue to monitor symptoms on pantoprazole.    Attention or concentration deficit  Comments:  Chronic. No new issues. To continue to follow with psyche.    Smoking trying to quit  -     CT Chest Lung Screening Low Dose; Future; Expected date: 03/17/2025    Pneumococcal vaccination administered at current visit  -     pneumoc 20-aris conj-dip cr(PF) (PREVNAR-20 (PF)) injection Syrg 0.5 mL    Long-term use of high-risk medication  -     Drug Tox Monitoring 1, W/Conf, Oral Fluid; Future; Expected date: 03/17/2025    Encounter for therapeutic drug monitoring  -     Drug Tox Monitoring 1, W/Conf, Oral Fluid; Future; Expected date: 03/17/2025    Personal history of smoking  -     CT Chest Lung Screening Low Dose; Future; Expected date: 03/17/2025      AdditionalAssessment & Plan    - Reviewed recent neck treatments, including steroid injections and plan rhizotomy. Explained that rhizotomy involves burning nerves to reduce pain.  - Assessed effectiveness of current pain management regimen.  - Evaluated blood pressure control, noting improvement with lisinopril.  - Considered pneumococcal vaccination due to age and recent illness.    CERVICALGIA (NECK PAIN):  - Continue using neck collar while sleeping as needed.  - Monitor ongoing neck pain and treatment with Dr. Deras, including recent  planned rhizotomy.  - Evaluate improvement in neck pain after recent procedure, noting persistent soreness.  - Review recent MRI results to assess neck  condition.  - Acknowledge planned rhizotomy procedure and its potential effectiveness based on patient's response to epidural steroid injection.  - Administer Soma at night for muscle spasms and pain medication every 6 hours.  - Schedule rhizotomy procedure for next week to treat neck pain.  - Continued pain medication every 6 hours.    MUSCLE SPASM:  - Continue Soma for muscle spasms at night.  - Monitor muscle soreness in upper body, particularly during sleep.    HYPERTENSION:  - Continue lisinopril for blood pressure control.  - Note current blood pressure reading of 134/80, which is considered good.      GASTROESOPHAGEAL REFLUX DISEASE:  - Continue Pantoprazole for acid reflux.    ATTENTION-DEFICIT HYPERACTIVITY DISORDER:  - Evaluate improved concentration with Adderall.  - Note that Adderall is taken daily, as prescribed by psychiatrist Dr. Lee.    NICOTINE DEPENDENCE:  - Recommend pursuing smoking cessation.  - Monitor smoking habits: currently 4-5 cigarettes per day, or 1 pack every 4 days.  - Discuss smoking history and previous quit attempts with the patient.  - Order CT chest lung screening to check for early signs of lung cancer.  - Discuss use of nicotine patches as a smoking cessation aid.  - Provide information on the purpose of lung cancer screening for early detection.        Follow up in about 3 months (around 6/17/2025) for Chronic Pain.        This note was generated with the assistance of ambient listening technology. Verbal consent was obtained by the patient and accompanying visitor(s) for the recording of patient appointment to facilitate this note. I attest to having reviewed and edited the generated note for accuracy, though some syntax or spelling errors may persist. Please contact the author of this note for any clarification.      3/30/2025 Sherri Snow         [1]   Social History  Socioeconomic History    Marital status: Single   Tobacco Use    Smoking status: Never     Passive  exposure: Yes    Smokeless tobacco: Never    Tobacco comments:     Vaping   Substance and Sexual Activity    Alcohol use: Not Currently     Comment: No drinks    Drug use: Yes    Sexual activity: Not Currently     Partners: Female     Birth control/protection: Condom   Social History Narrative    ** Merged History Encounter **          Social Drivers of Health     Financial Resource Strain: Low Risk  (3/21/2024)    Overall Financial Resource Strain (CARDIA)     Difficulty of Paying Living Expenses: Not hard at all   Food Insecurity: No Food Insecurity (3/21/2024)    Hunger Vital Sign     Worried About Running Out of Food in the Last Year: Never true     Ran Out of Food in the Last Year: Never true   Transportation Needs: No Transportation Needs (3/21/2024)    PRAPARE - Transportation     Lack of Transportation (Medical): No     Lack of Transportation (Non-Medical): No   Physical Activity: Insufficiently Active (3/21/2024)    Exercise Vital Sign     Days of Exercise per Week: 2 days     Minutes of Exercise per Session: 30 min   Stress: No Stress Concern Present (3/21/2024)    [employer] - Occupational Stress Questionnaire     Feeling of Stress : Only a little   Housing Stability: High Risk (3/21/2024)    Housing Stability Vital Sign     Unable to Pay for Housing in the Last Year: Yes     Unstable Housing in the Last Year: No

## 2025-03-17 NOTE — LETTER
AUTHORIZATION FOR RELEASE OF   CONFIDENTIAL INFORMATION    Dear Dr. Deras ,    We are seeing Beka Bee, date of birth 1960, in the clinic at [facility]. Sherri Snow MD is the patient's PCP. Beka Bee has an outstanding lab/procedure at the time we reviewed his chart. In order to help keep his health information updated, he has authorized us to request the following medical record(s):        (  )  MAMMOGRAM                                      (  )  COLONOSCOPY      (  )  PAP SMEAR                                          (  )  OUTSIDE LAB RESULTS     (  )  DEXA SCAN                                          (  )  EYE EXAM            (  )  FOOT EXAM                                          (  )  ENTIRE RECORD     (  )  OUTSIDE IMMUNIZATIONS                 ( X )  Last 3 office notes.          Please fax records to [facility], [place], Miyoshi, MD, [phone]     If you have any questions, please contact Amanda at [phone].           Patient Name: Beka Bee  : 1960  Patient Phone #: [phone]

## 2025-03-17 NOTE — TELEPHONE ENCOUNTER
----- Message from Sherri Snow MD sent at 3/17/2025  4:15 PM CDT -----  Can we get mri's, last few notes from Dr. Deras.  And DIS for CT lung screening test

## 2025-03-20 LAB
6MAM SAL QL SCN: NEGATIVE NG/ML
AMPHET SAL CFM-MCNC: 235 NG/ML
AMPHETAMINES SAL QL SCN: POSITIVE NG/ML
BARBITURATES SAL QL SCN: NEGATIVE NG/ML
BENZODIAZ SAL QL SCN: NEGATIVE NG/ML
BUPRENORPHINE SAL QL SCN: NEGATIVE NG/ML
CANNABINOIDS SAL QL SCN: NEGATIVE NG/ML
CARISOPRODOL SAL CFM-MCNC: 33.8 NG/ML
COCAINE SAL QL SCN: NEGATIVE NG/ML
CODEINE SAL CFM-MCNC: NEGATIVE NG/ML
COTININE SAL CFM-MCNC: 25.4 NG/ML
COTININE SAL QL SCN: POSITIVE NG/ML
DHC SAL CFM-MCNC: NEGATIVE NG/ML
FENTANYL SAL QL SCN: NEGATIVE NG/ML
HYDROCODONE SAL CFM-MCNC: NEGATIVE NG/ML
HYDROMORPHONE SAL CFM-MCNC: NEGATIVE NG/ML
MDMA SAL QL SCN: NEGATIVE NG/ML
MEPROBAMATE SAL CFM-MCNC: >250 NG/ML
MEPROBAMATE SAL QL SCN: POSITIVE NG/ML
METHADONE SAL QL SCN: NEGATIVE NG/ML
METHAMPHET SAL CFM-MCNC: NEGATIVE NG/ML
MORPHINE SAL CFM-MCNC: NEGATIVE NG/ML
NORHYDROCODONE SAL CFM-MCNC: NEGATIVE NG/ML
NOROXYCODONE SAL CFM-MCNC: 68.4 NG/ML
OPIATES SAL QL SCN: POSITIVE NG/ML
OXYCODONE SAL CFM-MCNC: >250 NG/ML
OXYMORPHONE SAL CFM-MCNC: NEGATIVE NG/ML
PCP SAL QL SCN: NEGATIVE NG/ML
TAPENTADOL SAL QL SCN: NEGATIVE NG/ML
TRAMADOL SAL QL SCN: NEGATIVE NG/ML
ZOLPIDEM SAL QL SCN: NEGATIVE NG/ML

## 2025-03-26 ENCOUNTER — TELEPHONE (OUTPATIENT)
Dept: FAMILY MEDICINE | Facility: CLINIC | Age: 65
End: 2025-03-26
Payer: MEDICARE

## 2025-03-26 DIAGNOSIS — K21.9 GASTROESOPHAGEAL REFLUX DISEASE, UNSPECIFIED WHETHER ESOPHAGITIS PRESENT: ICD-10-CM

## 2025-03-26 RX ORDER — ONDANSETRON 4 MG/1
4 TABLET, ORALLY DISINTEGRATING ORAL EVERY 6 HOURS PRN
Qty: 30 TABLET | Refills: 1 | Status: SHIPPED | OUTPATIENT
Start: 2025-03-26

## 2025-03-26 NOTE — TELEPHONE ENCOUNTER
Spoke with pt states [employer] called about a rx. Advised pt that she may work at [facility]. Pt voiced understanding.

## 2025-03-26 NOTE — TELEPHONE ENCOUNTER
----- Message from Gabby sent at 3/26/2025  3:20 PM CDT -----  Returning a phone call [phone]

## 2025-03-26 NOTE — TELEPHONE ENCOUNTER
The patient's prescription has been approved and sent to   [facility] [place]  [address]  [place] [address]  Phone: [phone] Fax: [phone]

## 2025-03-27 ENCOUNTER — TELEPHONE (OUTPATIENT)
Dept: FAMILY MEDICINE | Facility: CLINIC | Age: 65
End: 2025-03-27
Payer: MEDICARE

## 2025-03-27 NOTE — TELEPHONE ENCOUNTER
----- Message from Mckayla sent at 3/27/2025  2:50 PM CDT -----  Pt would like his ct lung screening to be sent to diagnostic imaging service on [address]. Fax:[phone]:[phone]

## 2025-03-30 ENCOUNTER — RESULTS FOLLOW-UP (OUTPATIENT)
Dept: FAMILY MEDICINE | Facility: CLINIC | Age: 65
End: 2025-03-30

## 2025-04-03 DIAGNOSIS — M62.838 MUSCLE SPASM: ICD-10-CM

## 2025-04-03 RX ORDER — CARISOPRODOL 350 MG/1
350 TABLET ORAL 3 TIMES DAILY PRN
Qty: 90 TABLET | Refills: 0 | Status: SHIPPED | OUTPATIENT
Start: 2025-04-04

## 2025-04-03 NOTE — TELEPHONE ENCOUNTER
The patient's prescription has been approved and sent to   [facility]  [address]  [place] [address]  Phone: [phone] Fax: [phone]

## 2025-04-10 DIAGNOSIS — M54.2 CHRONIC NECK PAIN: ICD-10-CM

## 2025-04-10 DIAGNOSIS — G89.29 CHRONIC NECK PAIN: ICD-10-CM

## 2025-04-10 RX ORDER — OXYCODONE AND ACETAMINOPHEN 10; 325 MG/1; MG/1
1 TABLET ORAL EVERY 6 HOURS PRN
Qty: 120 TABLET | Refills: 0 | Status: SHIPPED | OUTPATIENT
Start: 2025-04-11

## 2025-04-10 NOTE — TELEPHONE ENCOUNTER
The patient's prescription has been approved and sent to   [facility]  [address]  [place] [address]  Phone: [phone] Fax: [phone]

## 2025-04-14 ENCOUNTER — TELEPHONE (OUTPATIENT)
Dept: FAMILY MEDICINE | Facility: CLINIC | Age: 65
End: 2025-04-14
Payer: MEDICARE

## 2025-04-14 NOTE — TELEPHONE ENCOUNTER
Spoke with pt states he had low dose CT done. Has the disk. Advised pt that we have no way to read the disk   Looked on [facility] not able to print. Pt will bring a printed report.

## 2025-04-14 NOTE — TELEPHONE ENCOUNTER
----- Message from Beth sent at 4/14/2025  8:50 AM CDT -----  Pt is here w/ disk from [facility] to drop off for [id number]

## 2025-04-16 NOTE — PROGRESS NOTES
Per Dr. Snow   Let pt know : no nodules or sign of cancer. He does have signs of emphysema    Pt notified and voiced understanding.

## 2025-05-01 DIAGNOSIS — M62.838 MUSCLE SPASM: ICD-10-CM

## 2025-05-01 RX ORDER — CARISOPRODOL 350 MG/1
350 TABLET ORAL 3 TIMES DAILY PRN
Qty: 90 TABLET | Refills: 0 | Status: SHIPPED | OUTPATIENT
Start: 2025-05-02

## 2025-05-01 NOTE — TELEPHONE ENCOUNTER
prescription sent to   [facility] [address]  [place] [address]  Phone: [phone] Fax: [phone]

## 2025-05-08 DIAGNOSIS — M54.2 CHRONIC NECK PAIN: ICD-10-CM

## 2025-05-08 DIAGNOSIS — G89.29 CHRONIC NECK PAIN: ICD-10-CM

## 2025-05-08 RX ORDER — OXYCODONE AND ACETAMINOPHEN 10; 325 MG/1; MG/1
1 TABLET ORAL EVERY 6 HOURS PRN
Qty: 120 TABLET | Refills: 0 | Status: SHIPPED | OUTPATIENT
Start: 2025-05-09

## 2025-05-08 NOTE — TELEPHONE ENCOUNTER
The patient's prescription has been approved and sent to   [facility]  [address]  [place] [address]  Phone: [phone] Fax: [phone]

## 2025-05-29 DIAGNOSIS — M62.838 MUSCLE SPASM: ICD-10-CM

## 2025-05-29 RX ORDER — CARISOPRODOL 350 MG/1
350 TABLET ORAL 3 TIMES DAILY PRN
Qty: 90 TABLET | Refills: 0 | Status: SHIPPED | OUTPATIENT
Start: 2025-05-30 | End: 2025-05-30 | Stop reason: SDUPTHER

## 2025-05-29 NOTE — TELEPHONE ENCOUNTER
The patient's prescription has been approved and sent to   [facility]  [address]  [place] [address]  Phone: [phone] Fax: [phone]

## 2025-05-30 DIAGNOSIS — M62.838 MUSCLE SPASM: ICD-10-CM

## 2025-05-30 RX ORDER — CARISOPRODOL 350 MG/1
350 TABLET ORAL 3 TIMES DAILY PRN
Qty: 90 TABLET | Refills: 0 | Status: SHIPPED | OUTPATIENT
Start: 2025-05-30

## 2025-05-30 RX ORDER — CARISOPRODOL 350 MG/1
350 TABLET ORAL 3 TIMES DAILY PRN
Qty: 90 TABLET | Refills: 0 | OUTPATIENT
Start: 2025-06-01

## 2025-06-04 DIAGNOSIS — M54.2 CHRONIC NECK PAIN: ICD-10-CM

## 2025-06-04 DIAGNOSIS — G89.29 CHRONIC NECK PAIN: ICD-10-CM

## 2025-06-04 RX ORDER — OXYCODONE AND ACETAMINOPHEN 10; 325 MG/1; MG/1
1 TABLET ORAL EVERY 6 HOURS PRN
Qty: 120 TABLET | Refills: 0 | Status: SHIPPED | OUTPATIENT
Start: 2025-06-04

## 2025-06-10 DIAGNOSIS — K21.9 GASTROESOPHAGEAL REFLUX DISEASE, UNSPECIFIED WHETHER ESOPHAGITIS PRESENT: ICD-10-CM

## 2025-06-10 RX ORDER — SUCRALFATE 1 G/1
1 TABLET ORAL
Qty: 60 TABLET | Refills: 1 | Status: SHIPPED | OUTPATIENT
Start: 2025-06-10

## 2025-06-11 RX ORDER — ONDANSETRON 4 MG/1
4 TABLET, ORALLY DISINTEGRATING ORAL EVERY 6 HOURS PRN
Qty: 30 TABLET | Refills: 1 | Status: SHIPPED | OUTPATIENT
Start: 2025-06-11

## 2025-06-11 NOTE — TELEPHONE ENCOUNTER
Refill Routing Note   Medication(s) are not appropriate for processing by [facility] for the following reason(s):        Outside of protocol    [facility] action(s):  Route               Appointments  past 12m or future 3m with PCP    Date Provider   Last Visit   3/17/2025 Sherri Snow MD   Next Visit   6/10/2025 Sherri Snow MD   ED visits in past 90 days: 0        Note composed:10:46 PM 06/10/2025

## 2025-06-11 NOTE — TELEPHONE ENCOUNTER
The patient's prescription has been approved and sent to   [facility]  [address]  [place] [address]  Phone: [phone] Fax: [phone]

## 2025-06-18 NOTE — PROGRESS NOTES
SUBJECTIVE:    Patient ID: Beka Bee is a 65 y.o. male.    Chief Complaint: Follow-up (3 mo f/u//no med bottles//no new issues//discuss weight loss//tc)    Pt here to checkup on acute and chronic conditions.    PMHx of cervical DDD, GERD, ADD.    His neck continues to bother him.   (S/p  ROMAN). He continues to sleep with the neck brace or he can't turn his neck at night.  Not sleeping well during the night either to go to the bathroom or because his neck hurts, every 1-2 hours. 3 weeks ago, had nerves on the left burned which helped some, but it didn't work on the right. ([place]). Takes pain med, which he is prescribed to take every 8 hours. States his soma does help with spasms.   Uses his tens unit at night with a heating pad. Also uses a massager which he likes but it cuts off after 15 minutes. Doesn't think the voltaren cream helps.   Has also been put on gabapentin for the nerve pain at night but he can't really tell.     Worked as a , but has not worked since January 2024.  NS ([facility]) did surgery 8/21/2023, total replacement of cervical intervertebral disk. (Currently on Disability at work).    Has chronic back pain. Has left sciatic back pain.  (Failed ibuprofen, naproxen)  (failed tizanidine, robaxin, flexeril)  Soma and valium has helped.     Still smoking a few cigarettes a day, trying to quit. Was on the patches in the past, has the 1800 number to call for them    BP is slightly elevated today. Took medication today. Denies CP/SOB/HA.  Weight is stable.    His psychiatrist gives him something for sleep at night.   Taking ambien (failed trazodone-groggy; temazepam). Thinks the  adderall might be keeping him up.  Feels like he is down but not depressed.  (CASSIA Beckett)    Hasn't followed up with GI. Has been taking carafate. States his stomach has not been bothering him lately.       Has not had labs done.    Continues to lose weight.        ------------------------------------------------------------------------  Cscope 3/2023  Takes Men's 50 MVI        Office Visit on 03/17/2025   Component Date Value Ref Range Status    Amphetamines 03/17/2025 POSITIVE (A)  <10 ng/mL Final    AMPHETAMINE 03/17/2025 235 (H)  <10 ng/mL Final    METHAMPHETAMINE 03/17/2025 Negative  <10 ng/mL Final    Barbiturates 03/17/2025 NEGATIVE  <10 ng/mL Final    Benzodiazepines 03/17/2025 NEGATIVE  <0.50 ng/mL Final    Buprenorphine 03/17/2025 NEGATIVE  <0.10 ng/mL Final    Cocaine 03/17/2025 NEGATIVE  <5.0 ng/mL Final    Fentanyl 03/17/2025 NEGATIVE  <0.10 ng/mL Final    Heroin Screen, Serum/Plasma 03/17/2025 NEGATIVE  <1.0 ng/mL Final    THC 03/17/2025 NEGATIVE  <2.5 ng/mL Final    MDMA 03/17/2025 NEGATIVE  <10 ng/mL Final    Meprobamate Lvl 03/17/2025 POSITIVE (A)  <2.5 ng/mL Final    CARISOPRODOL 03/17/2025 33.8 (H)  <2.5 ng/mL Final    Meprobamate Lvl 03/17/2025 >250.0 (H)  <2.5 ng/mL Final    Methadone 03/17/2025 NEGATIVE  <5.0 ng/mL Final    Nicotine Metabolite(s), Serum 03/17/2025 POSITIVE (A)  <5.0 ng/mL Final    Cotinine 03/17/2025 25.4 (H)  <5.0 ng/mL Final    Opiates 03/17/2025 POSITIVE (A)  <2.5 ng/mL Final    Codeine 03/17/2025 Negative  <2.5 ng/mL Final    Dihydrocodeine 03/17/2025 Negative  <2.5 ng/mL Final    Hydrocodone 03/17/2025 Negative  <2.5 ng/mL Final    HYDROMORPHONE 03/17/2025 Negative  <2.5 ng/mL Final    Morphine 03/17/2025 Negative  <2.5 ng/mL Final    NORHYDROCODONE 03/17/2025 Negative  <2.5 ng/mL Final    NOROXYCODONE 03/17/2025 68.4 (H)  <2.5 ng/mL Final    OXYCODONE 03/17/2025 >250.0 (H)  <2.5 ng/mL Final    OXYMORPHONE 03/17/2025 Negative  <2.5 ng/mL Final    Phencyclidine 03/17/2025 NEGATIVE  <10 ng/mL Final    Tapentadol 03/17/2025 NEGATIVE  <5.0 ng/mL Final    TRAMADOL 03/17/2025 NEGATIVE  <5.0 ng/mL Final    ZOLPIDEM 03/17/2025 NEGATIVE  <5.0 ng/mL Final       Past Medical History:   Diagnosis Date    ADHD (attention deficit  hyperactivity disorder)     Depression     Digestive disorder     colon polyps    PUD (peptic ulcer disease) 1990    blood transfusion     Social History     Socioeconomic History    Marital status: Single   Tobacco Use    Smoking status: Never     Passive exposure: Yes    Smokeless tobacco: Never    Tobacco comments:     Vaping   Substance and Sexual Activity    Alcohol use: Not Currently     Comment: No drinks    Drug use: Yes    Sexual activity: Not Currently     Partners: Female     Birth control/protection: Condom   Social History Narrative    ** Merged History Encounter **          Social Drivers of Health     Financial Resource Strain: Low Risk  (3/21/2024)    Overall Financial Resource Strain (CARDIA)     Difficulty of Paying Living Expenses: Not hard at all   Food Insecurity: No Food Insecurity (3/21/2024)    Hunger Vital Sign     Worried About Running Out of Food in the Last Year: Never true     Ran Out of Food in the Last Year: Never true   Transportation Needs: No Transportation Needs (3/21/2024)    PRAPARE - Transportation     Lack of Transportation (Medical): No     Lack of Transportation (Non-Medical): No   Physical Activity: Insufficiently Active (3/21/2024)    Exercise Vital Sign     Days of Exercise per Week: 2 days     Minutes of Exercise per Session: 30 min   Stress: No Stress Concern Present (3/21/2024)    [employer] [employer] - Occupational Stress Questionnaire     Feeling of Stress : Only a little   Housing Stability: High Risk (3/21/2024)    Housing Stability Vital Sign     Unable to Pay for Housing in the Last Year: Yes     Unstable Housing in the Last Year: No     Past Surgical History:   Procedure Laterality Date    COLONOSCOPY N/A 09/17/2021    Procedure: COLONOSCOPY;  Surgeon: Jesse Amrbosio MD;  Location: [facility];  Service: Endoscopy;  Laterality: N/A;    COLONOSCOPY N/A 03/07/2023    Procedure: COLONOSCOPY;  Surgeon: Jorge A Pereyra III, MD;  Location: [facility]  ENDO;  Service: Endoscopy;  Laterality: N/A;    COLONOSCOPY W/ POLYPECTOMY  03/07/2023    EPIDURAL STEROID INJECTION INTO CERVICAL SPINE  06/10/2024    ESOPHAGOGASTRODUODENOSCOPY N/A 07/16/2021    Procedure: EGD (ESOPHAGOGASTRODUODENOSCOPY);  Surgeon: Jesse Ambrosio MD;  Location: [facility] ENDO;  Service: Endoscopy;  Laterality: N/A;    KNEE ARTHROSCOPY Right 1990    TOTAL REPLACEMENT OF CERVICAL INTERVERTEBRAL DISC N/A 08/21/2023     Family History   Problem Relation Name Age of Onset    No Known Problems Mother      Cancer Father Ricardo Bee     Early death Father Ricardo Bee        Review of patient's allergies indicates:   Allergen Reactions    Aspirin Nausea And Vomiting     Gastric ulcers       Current Outpatient Medications:     cyproheptadine (PERIACTIN) 4 mg tablet, Take 4 mg by mouth 3 (three) times daily., Disp: , Rfl:     dextroamphetamine-amphetamine (ADDERALL) 20 mg tablet, Take 1 tablet by mouth 2 (two) times daily., Disp: , Rfl:     gabapentin (NEURONTIN) 600 MG tablet, Take 600 mg by mouth every evening., Disp: , Rfl:     lisinopriL (PRINIVIL,ZESTRIL) 20 MG tablet, Take 1 tablet (20 mg total) by mouth once daily., Disp: 90 tablet, Rfl: 3    ondansetron (ZOFRAN-ODT) 4 MG TbDL, dissolve 1 tablet (4 mg total) by mouth every 6 (six) hours as needed (nausea)., Disp: 30 tablet, Rfl: 1    sucralfate (CARAFATE) 1 gram tablet, Take 1 tablet (1 g total) by mouth 4 (four) times daily before meals and nightly., Disp: 60 tablet, Rfl: 1    zolpidem (AMBIEN) 10 mg Tab, Take 5 mg by mouth nightly as needed., Disp: , Rfl:     [START ON 6/30/2025] carisoprodoL (SOMA) 350 MG tablet, Take 1 tablet (350 mg total) by mouth 3 (three) times daily as needed for Muscle spasms., Disp: 90 tablet, Rfl: 0    [START ON 7/3/2025] oxyCODONE-acetaminophen (PERCOCET)  mg per tablet, Take 1 tablet by mouth every 6 (six) hours as needed for Pain., Disp: 120 tablet, Rfl: 0    pantoprazole (PROTONIX) 40 MG tablet, Take 1 tablet  "(40 mg total) by mouth once daily., Disp: 90 tablet, Rfl: 3    Review of Systems   Constitutional:  Negative for activity change, appetite change, fatigue, fever and unexpected weight change.   HENT:  Negative for hearing loss, rhinorrhea and trouble swallowing.    Eyes:  Negative for discharge and visual disturbance.   Respiratory:  Negative for cough, chest tightness, shortness of breath and wheezing.    Cardiovascular:  Negative for chest pain, palpitations and leg swelling.   Gastrointestinal:  Negative for abdominal pain, blood in stool, constipation, diarrhea, nausea and vomiting.        -heartburn   Endocrine: Negative for polydipsia and polyuria.   Genitourinary:  Negative for decreased urine volume, difficulty urinating, dysuria, frequency, hematuria and urgency.   Musculoskeletal:  Positive for back pain and neck pain. Negative for arthralgias, joint swelling and myalgias.   Skin:  Negative for rash.   Neurological:  Negative for dizziness, weakness, numbness and headaches.   Hematological:  Does not bruise/bleed easily.   Psychiatric/Behavioral:  Negative for confusion, decreased concentration, dysphoric mood, sleep disturbance and suicidal ideas. The patient is not nervous/anxious.    All other systems reviewed and are negative.         Objective:      Vitals:    06/19/25 1514 06/19/25 1529   BP: (!) 152/82 (!) 163/90   Pulse: 85    SpO2: 97%    Weight: 54 kg (119 lb)    Height: 6' 2" (1.88 m)          Wt Readings from Last 3 Encounters:   06/19/25 54 kg (119 lb)   03/17/25 55.3 kg (122 lb)   12/16/24 55.3 kg (122 lb)         Physical Exam  Vitals reviewed.   Constitutional:       General: He is not in acute distress.     Appearance: Normal appearance. He is well-developed.   HENT:      Head: Normocephalic and atraumatic.   Neck:      Thyroid: No thyromegaly.   Cardiovascular:      Rate and Rhythm: Normal rate and regular rhythm.      Heart sounds: Normal heart sounds. No murmur heard.     No friction " rub.   Pulmonary:      Effort: Pulmonary effort is normal.      Breath sounds: Normal breath sounds. No wheezing or rales.   Abdominal:      General: Bowel sounds are normal. There is no distension.      Palpations: Abdomen is soft.      Tenderness: There is no abdominal tenderness.   Musculoskeletal:      Cervical back: Neck supple.   Lymphadenopathy:      Cervical: No cervical adenopathy.   Skin:     General: Skin is warm and dry.      Findings: No rash.   Neurological:      Mental Status: He is alert and oriented to person, place, and time.   Psychiatric:         Attention and Perception: He is attentive.         Speech: Speech normal.         Behavior: Behavior normal.         Thought Content: Thought content normal.         Judgment: Judgment normal.           Assessment:       1. Primary hypertension    2. Chronic neck pain    3. Muscle spasm    4. Gastroesophageal reflux disease, unspecified whether esophagitis present    5. Cigarette nicotine dependence without complication    6. Long-term use of high-risk medication           Plan:       Primary hypertension  Comments:  Uncontrolled. Will continue to monitor BP on current medication regimen. May have to add B-blocker for sympathetic control.    Chronic neck pain  Comments:  Pain controlled. Will continue to monitor pain control and function on current regimen. To continue to use TENS, heat, brace.  Orders:  -     oxyCODONE-acetaminophen (PERCOCET)  mg per tablet; Take 1 tablet by mouth every 6 (six) hours as needed for Pain.  Dispense: 120 tablet; Refill: 0    Muscle spasm  Comments:  Chronic. Will continue to monitor on soma.  Orders:  -     carisoprodoL (SOMA) 350 MG tablet; Take 1 tablet (350 mg total) by mouth 3 (three) times daily as needed for Muscle spasms.  Dispense: 90 tablet; Refill: 0    Gastroesophageal reflux disease, unspecified whether esophagitis present  Comments:  Controlled. Will continue to monitor symptoms on protonix and  carafate.  Orders:  -     pantoprazole (PROTONIX) 40 MG tablet; Take 1 tablet (40 mg total) by mouth once daily.  Dispense: 90 tablet; Refill: 3    Cigarette nicotine dependence without complication  Comments:  Chronic. Pt encouraged to reach out to smoking cessation and get Nicoderm    Long-term use of high-risk medication  -     TSH w/reflex to FT4; Future; Expected date: 06/19/2025  -     Urinalysis, Reflex to Urine Culture Urine, Clean Catch; Future; Expected date: 06/19/2025  -     CBC Auto Differential; Future; Expected date: 06/19/2025  -     Lipid Panel; Future; Expected date: 06/19/2025  -     Comprehensive Metabolic Panel; Future; Expected date: 06/19/2025  -     Microalbumin/Creatinine Ratio, Urine; Future; Expected date: 06/19/2025        Labs and/or tests have been ordered for the evaluation/monitoring of acute/chronic conditions, to be done just before next visit.    Chronic Pain Notes:  Have discussed the risks and benefits of chronic narcotic therapy including pain control, dependence, and addiction.  The patient is aware and accepts the risks and benefits.  Patient is aware of the risk of taking narcotics combined with benzodiazepines/muscle relaxers/hypnotics, including but not limited to breathing difficulties, coma, and death; accepts the risks; and agrees to use medications only as prescribed.      Follow up in about 3 months (around 9/19/2025) for HTN, Chronic Pain.        6/19/2025 Sherri Snow

## 2025-06-19 ENCOUNTER — OFFICE VISIT (OUTPATIENT)
Dept: FAMILY MEDICINE | Facility: CLINIC | Age: 65
End: 2025-06-19
Payer: MEDICARE

## 2025-06-19 VITALS
DIASTOLIC BLOOD PRESSURE: 90 MMHG | WEIGHT: 119 LBS | BODY MASS INDEX: 15.27 KG/M2 | SYSTOLIC BLOOD PRESSURE: 163 MMHG | HEIGHT: 74 IN | HEART RATE: 85 BPM | OXYGEN SATURATION: 97 %

## 2025-06-19 DIAGNOSIS — I10 PRIMARY HYPERTENSION: Primary | ICD-10-CM

## 2025-06-19 DIAGNOSIS — K21.9 GASTROESOPHAGEAL REFLUX DISEASE, UNSPECIFIED WHETHER ESOPHAGITIS PRESENT: ICD-10-CM

## 2025-06-19 DIAGNOSIS — M54.2 CHRONIC NECK PAIN: ICD-10-CM

## 2025-06-19 DIAGNOSIS — G89.29 CHRONIC NECK PAIN: ICD-10-CM

## 2025-06-19 DIAGNOSIS — Z79.899 LONG-TERM USE OF HIGH-RISK MEDICATION: ICD-10-CM

## 2025-06-19 DIAGNOSIS — F17.210 CIGARETTE NICOTINE DEPENDENCE WITHOUT COMPLICATION: ICD-10-CM

## 2025-06-19 DIAGNOSIS — M62.838 MUSCLE SPASM: ICD-10-CM

## 2025-06-19 PROCEDURE — 3079F DIAST BP 80-89 MM HG: CPT | Mod: CPTII,S$GLB,, | Performed by: FAMILY MEDICINE

## 2025-06-19 PROCEDURE — 3077F SYST BP >= 140 MM HG: CPT | Mod: CPTII,S$GLB,, | Performed by: FAMILY MEDICINE

## 2025-06-19 PROCEDURE — 3288F FALL RISK ASSESSMENT DOCD: CPT | Mod: CPTII,S$GLB,, | Performed by: FAMILY MEDICINE

## 2025-06-19 PROCEDURE — 99214 OFFICE O/P EST MOD 30 MIN: CPT | Mod: S$GLB,,, | Performed by: FAMILY MEDICINE

## 2025-06-19 PROCEDURE — 1101F PT FALLS ASSESS-DOCD LE1/YR: CPT | Mod: CPTII,S$GLB,, | Performed by: FAMILY MEDICINE

## 2025-06-19 PROCEDURE — 1160F RVW MEDS BY RX/DR IN RCRD: CPT | Mod: CPTII,S$GLB,, | Performed by: FAMILY MEDICINE

## 2025-06-19 PROCEDURE — 3008F BODY MASS INDEX DOCD: CPT | Mod: CPTII,S$GLB,, | Performed by: FAMILY MEDICINE

## 2025-06-19 PROCEDURE — 1159F MED LIST DOCD IN RCRD: CPT | Mod: CPTII,S$GLB,, | Performed by: FAMILY MEDICINE

## 2025-06-19 PROCEDURE — 4010F ACE/ARB THERAPY RXD/TAKEN: CPT | Mod: CPTII,S$GLB,, | Performed by: FAMILY MEDICINE

## 2025-06-19 RX ORDER — CYPROHEPTADINE HYDROCHLORIDE 4 MG/1
4 TABLET ORAL 3 TIMES DAILY
COMMUNITY
Start: 2025-06-10

## 2025-06-19 RX ORDER — GABAPENTIN 600 MG/1
600 TABLET ORAL NIGHTLY
COMMUNITY
Start: 2025-05-28

## 2025-06-19 RX ORDER — DEXTROAMPHETAMINE SACCHARATE, AMPHETAMINE ASPARTATE, DEXTROAMPHETAMINE SULFATE AND AMPHETAMINE SULFATE 5; 5; 5; 5 MG/1; MG/1; MG/1; MG/1
1 TABLET ORAL 2 TIMES DAILY
COMMUNITY
Start: 2025-06-10

## 2025-06-19 RX ORDER — CARISOPRODOL 350 MG/1
350 TABLET ORAL 3 TIMES DAILY PRN
Qty: 90 TABLET | Refills: 0 | Status: SHIPPED | OUTPATIENT
Start: 2025-06-30

## 2025-06-19 RX ORDER — PANTOPRAZOLE SODIUM 40 MG/1
40 TABLET, DELAYED RELEASE ORAL DAILY
Qty: 90 TABLET | Refills: 3 | Status: SHIPPED | OUTPATIENT
Start: 2025-06-19 | End: 2026-06-19

## 2025-06-19 RX ORDER — OXYCODONE AND ACETAMINOPHEN 10; 325 MG/1; MG/1
1 TABLET ORAL EVERY 6 HOURS PRN
Qty: 120 TABLET | Refills: 0 | Status: SHIPPED | OUTPATIENT
Start: 2025-07-03

## 2025-06-19 RX ORDER — ZOLPIDEM TARTRATE 10 MG/1
5 TABLET ORAL NIGHTLY PRN
COMMUNITY

## 2025-07-24 DIAGNOSIS — M62.838 MUSCLE SPASM: ICD-10-CM

## 2025-07-24 NOTE — TELEPHONE ENCOUNTER
Refill Routing Note   Medication(s) are not appropriate for processing by [facility] for the following reason(s):        Outside of protocol    [facility] action(s):  Route               Appointments  past 12m or future 3m with PCP    Date Provider   Last Visit   6/19/2025 Sherri Snow MD   Next Visit   9/25/2025 Sherri nSow MD   ED visits in past 90 days: 0        Note composed:5:08 PM 07/24/2025

## 2025-07-29 RX ORDER — CARISOPRODOL 350 MG/1
350 TABLET ORAL 3 TIMES DAILY PRN
Qty: 90 TABLET | Refills: 0 | Status: SHIPPED | OUTPATIENT
Start: 2025-07-29

## 2025-07-29 NOTE — TELEPHONE ENCOUNTER
The patient's prescription has been approved and sent to   [facility]  [address]  [place] [address]  Phone: [phone] Fax: [phone]

## 2025-07-30 DIAGNOSIS — M54.2 CHRONIC NECK PAIN: ICD-10-CM

## 2025-07-30 DIAGNOSIS — G89.29 CHRONIC NECK PAIN: ICD-10-CM

## 2025-07-30 RX ORDER — OXYCODONE AND ACETAMINOPHEN 10; 325 MG/1; MG/1
1 TABLET ORAL EVERY 6 HOURS PRN
Qty: 120 TABLET | Refills: 0 | Status: SHIPPED | OUTPATIENT
Start: 2025-07-30

## 2025-07-30 NOTE — TELEPHONE ENCOUNTER
The patient's prescription has been approved and sent to   [facility]  [address]  [place] [address]  Phone: [phone] Fax: [phone]

## 2025-07-30 NOTE — TELEPHONE ENCOUNTER
Refill Routing Note   Medication(s) are not appropriate for processing by [facility] for the following reason(s):        Outside of protocol    [facility] action(s):  Route             Appointments  past 12m or future 3m with PCP    Date Provider   Last Visit   6/19/2025 Sherri Snow MD   Next Visit   9/25/2025 Sherri Snow MD   ED visits in past 90 days: 0        Note composed:9:45 AM 07/30/2025

## 2025-08-27 DIAGNOSIS — M54.2 CHRONIC NECK PAIN: ICD-10-CM

## 2025-08-27 DIAGNOSIS — G89.29 CHRONIC NECK PAIN: ICD-10-CM

## 2025-08-27 DIAGNOSIS — M62.838 MUSCLE SPASM: ICD-10-CM

## 2025-08-27 RX ORDER — OXYCODONE AND ACETAMINOPHEN 10; 325 MG/1; MG/1
1 TABLET ORAL EVERY 6 HOURS PRN
Qty: 120 TABLET | Refills: 0 | Status: SHIPPED | OUTPATIENT
Start: 2025-08-29

## 2025-08-27 RX ORDER — CARISOPRODOL 350 MG/1
350 TABLET ORAL 3 TIMES DAILY PRN
Qty: 90 TABLET | Refills: 0 | Status: SHIPPED | OUTPATIENT
Start: 2025-08-27